# Patient Record
Sex: MALE | Race: WHITE
[De-identification: names, ages, dates, MRNs, and addresses within clinical notes are randomized per-mention and may not be internally consistent; named-entity substitution may affect disease eponyms.]

---

## 2017-03-12 ENCOUNTER — HOSPITAL ENCOUNTER (INPATIENT)
Dept: HOSPITAL 25 - ED | Age: 82
LOS: 4 days | Discharge: NURSING, REHAB OR LONG-TERM CARE | DRG: 470 | End: 2017-03-16
Attending: INTERNAL MEDICINE | Admitting: HOSPITALIST
Payer: MEDICARE

## 2017-03-12 DIAGNOSIS — E87.8: ICD-10-CM

## 2017-03-12 DIAGNOSIS — I11.0: ICD-10-CM

## 2017-03-12 DIAGNOSIS — Z23: ICD-10-CM

## 2017-03-12 DIAGNOSIS — I48.0: ICD-10-CM

## 2017-03-12 DIAGNOSIS — I50.32: ICD-10-CM

## 2017-03-12 DIAGNOSIS — D62: ICD-10-CM
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
## 2017-03-12 DIAGNOSIS — Z79.01: ICD-10-CM

## 2017-03-12 DIAGNOSIS — E87.1: ICD-10-CM

## 2017-03-12 DIAGNOSIS — I48.92: ICD-10-CM
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
## 2017-03-12 DIAGNOSIS — R41.0: ICD-10-CM

## 2017-03-12 DIAGNOSIS — Z95.2: ICD-10-CM
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
## 2017-03-12 DIAGNOSIS — S72.001A: Primary | ICD-10-CM

## 2017-03-12 DIAGNOSIS — M19.90: ICD-10-CM

## 2017-03-12 DIAGNOSIS — Z88.1: ICD-10-CM

## 2017-03-12 LAB
ALBUMIN SERPL BCG-MCNC: 4.2 G/DL (ref 3.2–5.2)
ALP SERPL-CCNC: 66 U/L (ref 34–104)
ALT SERPL W P-5'-P-CCNC: 19 U/L (ref 7–52)
ANION GAP SERPL CALC-SCNC: 7 MMOL/L (ref 2–11)
AST SERPL-CCNC: 30 U/L (ref 13–39)
BUN SERPL-MCNC: 19 MG/DL (ref 6–24)
BUN/CREAT SERPL: 17.6 (ref 8–20)
CALCIUM SERPL-MCNC: 9.7 MG/DL (ref 8.6–10.3)
CHLORIDE SERPL-SCNC: 91 MMOL/L (ref 101–111)
GLOBULIN SER CALC-MCNC: 3.4 G/DL (ref 2–4)
GLUCOSE SERPL-MCNC: 82 MG/DL (ref 70–100)
HCO3 SERPL-SCNC: 29 MMOL/L (ref 22–32)
HCT VFR BLD AUTO: 44 % (ref 42–52)
HGB BLD-MCNC: 15.1 G/DL (ref 14–18)
MCH RBC QN AUTO: 34 PG (ref 27–31)
MCHC RBC AUTO-ENTMCNC: 34 G/DL (ref 31–36)
MCV RBC AUTO: 99 FL (ref 80–94)
POTASSIUM SERPL-SCNC: 4.4 MMOL/L (ref 3.5–5)
PROT SERPL-MCNC: 7.6 G/DL (ref 6.4–8.9)
RBC # BLD AUTO: 4.44 10^6/UL (ref 4–5.4)
SODIUM SERPL-SCNC: 127 MMOL/L (ref 133–145)
TROPONIN I SERPL-MCNC: 0.01 NG/ML (ref ?–0.04)
WBC # BLD AUTO: 9.6 10^3/UL (ref 3.5–10.8)

## 2017-03-12 PROCEDURE — 83605 ASSAY OF LACTIC ACID: CPT

## 2017-03-12 PROCEDURE — 90732 PPSV23 VACC 2 YRS+ SUBQ/IM: CPT

## 2017-03-12 PROCEDURE — 80053 COMPREHEN METABOLIC PANEL: CPT

## 2017-03-12 PROCEDURE — 81015 MICROSCOPIC EXAM OF URINE: CPT

## 2017-03-12 PROCEDURE — 86901 BLOOD TYPING SEROLOGIC RH(D): CPT

## 2017-03-12 PROCEDURE — 85014 HEMATOCRIT: CPT

## 2017-03-12 PROCEDURE — 85610 PROTHROMBIN TIME: CPT

## 2017-03-12 PROCEDURE — 72170 X-RAY EXAM OF PELVIS: CPT

## 2017-03-12 PROCEDURE — 85025 COMPLETE CBC W/AUTO DIFF WBC: CPT

## 2017-03-12 PROCEDURE — 84484 ASSAY OF TROPONIN QUANT: CPT

## 2017-03-12 PROCEDURE — 93005 ELECTROCARDIOGRAM TRACING: CPT

## 2017-03-12 PROCEDURE — 71010: CPT

## 2017-03-12 PROCEDURE — C1776 JOINT DEVICE (IMPLANTABLE): HCPCS

## 2017-03-12 PROCEDURE — 85730 THROMBOPLASTIN TIME PARTIAL: CPT

## 2017-03-12 PROCEDURE — 88305 TISSUE EXAM BY PATHOLOGIST: CPT

## 2017-03-12 PROCEDURE — 86922 COMPATIBILITY TEST ANTIGLOB: CPT

## 2017-03-12 PROCEDURE — 82565 ASSAY OF CREATININE: CPT

## 2017-03-12 PROCEDURE — 88311 DECALCIFY TISSUE: CPT

## 2017-03-12 PROCEDURE — 85018 HEMOGLOBIN: CPT

## 2017-03-12 PROCEDURE — 85060 BLOOD SMEAR INTERPRETATION: CPT

## 2017-03-12 PROCEDURE — 86850 RBC ANTIBODY SCREEN: CPT

## 2017-03-12 PROCEDURE — 84520 ASSAY OF UREA NITROGEN: CPT

## 2017-03-12 PROCEDURE — 36415 COLL VENOUS BLD VENIPUNCTURE: CPT

## 2017-03-12 PROCEDURE — 81003 URINALYSIS AUTO W/O SCOPE: CPT

## 2017-03-12 PROCEDURE — 62323 NJX INTERLAMINAR LMBR/SAC: CPT

## 2017-03-12 PROCEDURE — 80048 BASIC METABOLIC PNL TOTAL CA: CPT

## 2017-03-12 PROCEDURE — C1713 ANCHOR/SCREW BN/BN,TIS/BN: HCPCS

## 2017-03-12 PROCEDURE — 86900 BLOOD TYPING SEROLOGIC ABO: CPT

## 2017-03-12 PROCEDURE — P9040 RBC LEUKOREDUCED IRRADIATED: HCPCS

## 2017-03-12 RX ADMIN — OXYCODONE HYDROCHLORIDE PRN MG: 5 CAPSULE ORAL at 21:56

## 2017-03-12 RX ADMIN — SOTALOL HYDROCHLORIDE SCH MG: 80 TABLET ORAL at 17:37

## 2017-03-12 RX ADMIN — Medication SCH CAP: at 20:46

## 2017-03-12 RX ADMIN — ACETAMINOPHEN SCH MG: 325 TABLET ORAL at 20:46

## 2017-03-12 RX ADMIN — HEPARIN SODIUM SCH UNITS: 5000 INJECTION INTRAVENOUS; SUBCUTANEOUS at 21:52

## 2017-03-12 RX ADMIN — BIMATOPROST SCH DROP: 0.1 SOLUTION/ DROPS OPHTHALMIC at 20:47

## 2017-03-12 NOTE — ED
Suze MACEDO Michael, scribed for John Franklin MD on 17 at 1222 .





Adult Trauma





- HPI Summary


HPI Summary: 





88 y/o male comes to the ED presenting with right hip pain after slipping on 

ice while brushing the snow off his car today at 0945. The pt reports that he 

fell on his right hip and was unable to get up after the fall because the pain 

was so severe. Currently at the ED, the pt rates the pain a 2 out of 10 without 

movement. With movement, the pain is rated a 4 out of 10 on a pain severity 

scale. The right hip pain does not radiate. The pt denies head pain, neck pain, 

and LOC. The PMHx is significant for GERD, HTN, and A-fib.





- History of Current Complaint


Chief Complaint: EDExtremityLower


Stated Complaint: FALL


Time Seen by Provider: 17 11:51


Hx Obtained From: Patient, Medical Records


Mechanism of Injury: Fall


Loss of Consciousness: no loss of consciousness


Onset/Duration: Started Hours Ago, Still Present


Onset of Pain: Immediate


Onset Severity: Moderate


Current Severity: Mild


Pain Intensity: 1


Pain Scale Used: 0-10 Numeric


Location: Extremities - right hip pain


Aggravating Factor(s): Movement


Associated Signs & Symptoms: Positive: Negative.  Negative: Loss of 

Consciousness - head and neck trauma





- Additional Pertinent History


Primary Care Physician: ANNA MARIE





- Allergy/Home Medications


Allergies/Adverse Reactions: 


 Allergies











Allergy/AdvReac Type Severity Reaction Status Date / Time


 


Tetracyclines & Related Allergy  Unknown Verified 17 14:19





   Reaction  





   Details  











Home Medications: 


 Home Medications





Bimatoprost 0.01% OPHTH (NF) [Lumigan 0.01% OPHTH (NF)] 1 drop BOTH EYES QPM  [History Confirmed 17]


Sotalol TAB* [Betapace 80 MG TAB*] 40 mg PO BID 17 [History Confirmed ]











PMH/Surg Hx/FS Hx/Imm Hx


Endocrine/Hematology History: Reports: Other Endocrine/Hematological Disorders 

- Pt reports Hx of Rheumatoid arthritis for two years, then dismissed


Cardiovascular History: Reports: Hx Atrial Fibrillation, Hx Congestive Heart 

Failure, Hx Hypertension - on meds


Respiratory History: Reports: Hx Pneumonia - 5 years ago


   Denies: Hx Asthma, Hx Chronic Obstructive Pulmonary Disease (COPD)


GI History: Reports: Hx Gastroesophageal Reflux Disease


 History: Reports: Other  Problems/Disorders - BPH


Musculoskeletal History: Reports: Hx Orthopedic Injury - R ankle


Sensory History: Reports: Hx Contacts or Glasses, Hx Glaucoma


Opthamlomology History: Reports: Hx Contacts or Glasses, Hx Glaucoma





- Surgical History


Surgery Procedure, Year, and Place: "Kidney lesions removed when I was very 

young", bilateral carpal tunnel, arthroscopy, tonsillectomy, dental surgeries


Hx Anesthesia Reactions: No


Infectious Disease History: No


Infectious Disease History: 


   Denies: Traveled Outside the [place] in Last 30 Days





- Family History


Known Family History: Positive: None


Family History: pt denies significant FHx





- Social History


Occupation: Retired


Lives: With Family


Alcohol Use: None


Substance Use Type: Reports: None


Smoking Status (MU): Never Smoked Tobacco





Review of Systems


Negative: Fever


Positive: Other - right hip pain


Negative: Syncope


All Other Systems Reviewed And Are Negative: Yes





Physical Exam





- Summary


Physical Exam Summary: 





Vital signs: reviewed


General: Patient is comfortable lying in stretcher with no signs of distress


HEENT: within normal limits


Lungs: CTA B/L


CVS: S1 & S2 present. No murmurs appreciated.


ABDOMEN: Soft, non-tender. No signs of distention. No rebound no guarding, and 

no masses palpated. Bowel sounds are normal. 


EXTREMITIES: Decrease ROM of right hip secondary to pain, good pulses and 

capillary refill.


NEURO: Alert and oriented x 3. No acute neurological deficits. Speech is normal 

and follows commands. 


SKIN: Dry and warm





Triage Information Reviewed: Yes


Vital Signs On Initial Exam: 


 Initial Vitals











Temp Pulse Resp BP Pulse Ox


 


 97.7 F   69   16   160/82   94 


 


 17 11:46  17 11:46  17 11:46  17 11:46  17 11:46











Vital Signs Reviewed: Yes





Diagnostics





- Vital Signs


 Vital Signs











  Temp Pulse Resp BP Pulse Ox


 


 17 11:46  97.7 F  69  16  160/82  94














- Laboratory


Result Diagrams: 


 17 12:14





 17 12:14


Lab Statement: Any lab studies that have been ordered have been reviewed, and 

results considered in the medical decision making process.





- Radiology


  ** CXR


Xray Interpretation: No Acute Changes


Radiology Interpretation Completed By: Radiologist





  ** Right hip/pelvis XR


Xray Interpretation: Positive (See Comments) - compacted fracture right femoral 

neck


Radiology Interpretation Completed By: Radiologist





- EKG


  ** EK


EKG Rhythm: Sinus Rhythm - 84 bpm


EKG Interpretation: no st elevation





  ** EKG 1248


EKG Rhythm: Sinus Rhythm


EKG Interpretation: no st elevation 





Adult Trauma Course/Dx





- Course


Course Of Treatment: 88 y/o male comes to the ED presenting with right hip pain 

after slipping on ice while brushing the snow off his car today at 0945. The pt 

reports that he fell on his right hip and was unable to get up after the fall 

because the pain was so severe. Currently at the ED, the pt rates the pain a 2 

out of 10 without movement. With movement, the pain is rated a 4 out of 10 on a 

pain severity scale. The right hip pain does not radiate. The pt denies head 

pain, neck pain, and LOC. The PMHx is significant for GERD, HTN, and A-fib.  

Blood work is within normal limits and showed INR 1.22 and sodium 127. The XR 

of the right hip and pelvis shows right femoral neck fracture. CXR shows no 

active cardiopulmonary disease. The EKG shows NSR with no st elevation. The 

patient has become comfortable with no pain due to medication. Dr. Ashraf (

orthopedics) was contacted and Dr. Ashraf will consult the patient.  Dr. Rodriguez (Hospitalist) was consulted and accepts the patient to the hospital.





- Diagnoses


Differential Diagnosis/HQI/PQRI: Positive: Fracture, Laceration(s), Sprain, 

Strain


Provider Diagnoses: 


 Fracture of femoral neck, right








Discharge





- Discharge Plan


Condition: Stable


Disposition: ADMITTED TO [facility]


Discharge Disposition Comment: Dr. Rodriguez accepts the patient as an admission





The documentation as recorded by the Suze martinez Michael accurately 

reflects the service I personally performed and the decisions made by me, John Franklin MD.

## 2017-03-12 NOTE — RAD
Indication: Fall, right hip pain.



2 views of the right hip and an AP view the pelvis demonstrates a fracture through the

neck of the right femoral neck. Overriding of the fracture fragment is noted. Pelvic ring

is otherwise intact.



IMPRESSION: Impacted fracture right femoral neck.

## 2017-03-12 NOTE — PN
Progress Note





- Progress Note


Note: 





Full note dictated. Right displaced femoral neck fracture. He is 87 and walks 

with a walker for the last 6 months. He takes Xarelto for A fib.





We have to wait at least 24 hours for the Xarelto to clear his system. We will 

arrange for right hip hemiarthroplasty or total hip arthroplasty once cleared 

medically.

## 2017-03-12 NOTE — HP
HOSPITAL MEDICINE HISTORY AND PHYSICAL:

 

DATE OF ADMISSION:  03/12/17

 

PRIMARY CARE PHYSICIAN:  Dr. Stone.

 

ATTENDING PHYSICIAN:  Dr. Marino Rodriguez* (dictation provided by Mandy Hodge NP)
.

 

CHIEF COMPLAINT:  Fall with right hip pain.

 

HISTORY OF PRESENT ILLNESS:  Mr. Don is an 87-year-old male with a past 
medical history of AFib/flutter with aortic stenosis, status post aortic valve 
replacement in May of last year as well as osteoarthritis who presents today to 
the hospital with concern for right hip pain after a fall.  The patient states 
that he was out brushing the snow off the car when he slipped on ice on his 
right hip and had severe pain.  He called his son who came to get him.  He was 
able to get him into the car and arrived in to the emergency room.  Mr. Don 
states that prior to this fall, he had been in his normal state of health.  He 
has had no acute complaints other than problems with arthritis and intermittent 
pain in his joints.  He denies fevers, chills, chest pain, shortness of breath, 
cough, nausea, vomiting, or diarrhea.

 

In the emergency room, Mr. Don was confirmed to have an impacted fracture of 
the right femoral neck.  His lab values are essentially unremarkable with a 
chronic hyponatremia, was essentially unchanged.

 

PAST MEDICAL HISTORY:

1.  Atrial fibrillation/flutter.

2.  Aortic stenosis, status post aortic valve replacement in May 2016, 
bioprosthetic valve.

3.  Osteoarthritis.

4.  Previous question of rheumatoid arthritis, ruled out.

5.  Hypertension.

6.  Surgery for kidney adhesions decades ago.

 

MEDICATIONS:

1.  Bimatoprost 0.01% ophthalmically both eyes q.p.m.

2.  Diltiazem 240 mg p.o. daily.

3.  Furosemide 20 mg p.o. daily.

4.  Potassium chloride 10 mEq p.o. daily.

5.  Rivaroxaban 15 mg p.o. daily.

6.  Zoloft 40 mg p.o. b.i.d.

 

ALLERGIES:  TETRACYCLINES and related.

 

FAMILY HISTORY:  No report of coronary artery disease, diabetes, or cancer in 
the family.  He does have a daughter who has diabetes, but he states that that 
runs in the wife's side of the family.

 

SOCIAL HISTORY:  No report of tobacco or drug use.  The patient states he 
drinks wine occasionally.  He is a [employer] .  He is .  He has 4 
children. His healthcare proxy would be his son, Jose Don.

 

REVIEW OF SYSTEMS:  The patient reports having upper respiratory infection 
about a week ago, but states he is completely recovered from that.  He does 
have chronic pain in his shoulders and ankles from arthritis, but a 14-point 
review of systems was otherwise performed and all those not mentioned were 
negative.

 

                               PHYSICAL EXAMINATION

 

GENERAL:  Mr. Don is lying in the bed.  He is in no acute distress.  He is 
calm and cooperative with my examination.

 

VITAL SIGNS:  Temperature 97.8, pulse rate 82, respiratory rate 20, O2 
saturation 93% on room air, blood pressure 162/71.

 

LUNGS:  Clear to auscultation bilaterally with no accessory muscle use and good 
aeration.

 

HEART:  S1, S2.  No murmur, rub, or gallop and regular.

 

ABDOMEN:  Soft, nontender with bowel sounds positive x4.

 

EXTREMITIES:  No cyanosis or edema.

 

NEUROLOGIC:  He is alert and oriented x3.  He moves all extremities equally.  
There is no facial asymmetry or focal weakness.  Extraocular movements are 
intact.

 

SKIN:  Intact.

 

 LABORATORY DATA AND DIAGNOSTIC STUDIES:  WBC 9.6, hemoglobin 15.1, hematocrit 
44, platelet count 134.  INR 1.22.  Sodium 127, potassium 4.4, chloride 91, 
serum bicarbonate 29, BUN 19, creatinine 1.08, glucose 82.  Troponin 0.01.  
Urine shows no evidence of infection.

 

Hip pelvis x-ray is as read as above and chest x-ray shows the following:  "No 
active cardiopulmonary disease is noted."

 

ASSESSMENT:  Mr. Don is an 87-year-old male with a past medical history of 
atrial fibrillation with aortic stenosis and valve replacement with a 
bioprosthetic valve in May 2016 who presents today to the hospital with concern 
for right hip fracture after a mechanical fall.  Our plan is as follows:

 

1.  Right hip fracture:  Management will be per Orthopedics.  Dr. Ashraf has 
been consulted.  The patient is on Xarelto and will need at least 24 hours 
before he could go to the OR after being off Xarelto.  According to Dr. Ashraf, 
the patient may go to the OR as late as Tuesday.  Family indicates that they 
are considering asking to have Dr. Arango do the surgery as they were very happy 
with the surgery that she did for the patient's wife.  I told them that they 
need to make sure to let the orthopedic team know that as soon as possible if 
that is their decision. In terms of cardiac risk stratification, according to 
the revised cardiac risk index for preoperative risk assessment, the patient 
has no risk factors and is 0.4% risk of major cardiac event.  The patient 
states he is able to obtain 4 METS of activity, i.e., going about flight of 
stairs with carrying a bag of groceries without chest pain or shortness of 
breath.  He says overall his activity is limited by arthritis and he gets 
around with a cane.  The patient had a recent echocardiogram through Dr. Gupta
's office and actually had cardiac catheterization in May prior to his aortic 
valve surgery.  The patient states there was no significant coronary artery 
disease identified and no stent placed.  From my perspective, the patient has 
no indication for further cardiac testing.

2.  Atrial fibrillation/flutter:  Plan to continue sotalol.  Plan to hold 
Xarelto. Continue diltiazem.

3.  Hypertension:  Continue with diltiazem, furosemide, and potassium 
supplementation.

4.  DVT prophylaxis with heparin subcu.

5.  Code status is full code and this was reviewed with the patient at the 
bedside.

6.  Disposition to surgical floor.

 

TIME SPENT:  Approximately 60 minutes were spent on the admission of this 
patient, more than half time spent with him at the bedside reviewing the events 
leading up to this hospitalization, performing the physical examination, and 
reviewing my plan of care.

 

 ____________________________________ 

MANDY HODGE NP



CC:  Dr. Stone *

 

[id number]/[facility] #: [id number]

ZELDA

## 2017-03-12 NOTE — RAD
Indication: Fall. Chest injury.



Single view of the chest demonstrates patient to be status post transsternal thoracotomy.

Lung fields demonstrate no pleural fluid, pneumonia or pneumothorax.



When compared to previous exam of April 20, 2016 postoperative changes are new. The lung

fields are unchanged.



IMPRESSION: No active cardiopulmonary disease is noted.

## 2017-03-13 LAB
ANION GAP SERPL CALC-SCNC: 7 MMOL/L (ref 2–11)
BUN SERPL-MCNC: 18 MG/DL (ref 6–24)
BUN/CREAT SERPL: 20.5 (ref 8–20)
CALCIUM SERPL-MCNC: 9.1 MG/DL (ref 8.6–10.3)
CHLORIDE SERPL-SCNC: 92 MMOL/L (ref 101–111)
GLUCOSE SERPL-MCNC: 94 MG/DL (ref 70–100)
HCO3 SERPL-SCNC: 26 MMOL/L (ref 22–32)
HCT VFR BLD AUTO: 42 % (ref 42–52)
HGB BLD-MCNC: 14.2 G/DL (ref 14–18)
POTASSIUM SERPL-SCNC: 4.1 MMOL/L (ref 3.5–5)
SODIUM SERPL-SCNC: 125 MMOL/L (ref 133–145)

## 2017-03-13 RX ADMIN — ACETAMINOPHEN SCH MG: 325 TABLET ORAL at 07:10

## 2017-03-13 RX ADMIN — ACETAMINOPHEN SCH MG: 325 TABLET ORAL at 21:14

## 2017-03-13 RX ADMIN — TRAMADOL HYDROCHLORIDE PRN MG: 50 TABLET, FILM COATED ORAL at 19:09

## 2017-03-13 RX ADMIN — TRAMADOL HYDROCHLORIDE PRN MG: 50 TABLET, FILM COATED ORAL at 19:06

## 2017-03-13 RX ADMIN — Medication SCH CAP: at 21:14

## 2017-03-13 RX ADMIN — OXYCODONE HYDROCHLORIDE PRN MG: 5 CAPSULE ORAL at 07:09

## 2017-03-13 RX ADMIN — SOTALOL HYDROCHLORIDE SCH MG: 80 TABLET ORAL at 17:01

## 2017-03-13 RX ADMIN — HEPARIN SODIUM SCH UNITS: 5000 INJECTION INTRAVENOUS; SUBCUTANEOUS at 06:10

## 2017-03-13 RX ADMIN — POTASSIUM CHLORIDE SCH MEQ: 750 TABLET, FILM COATED, EXTENDED RELEASE ORAL at 08:45

## 2017-03-13 RX ADMIN — HEPARIN SODIUM SCH UNITS: 5000 INJECTION INTRAVENOUS; SUBCUTANEOUS at 14:00

## 2017-03-13 RX ADMIN — Medication SCH CAP: at 08:46

## 2017-03-13 RX ADMIN — DILTIAZEM HYDROCHLORIDE SCH MG: 240 CAPSULE, COATED, EXTENDED RELEASE ORAL at 08:46

## 2017-03-13 RX ADMIN — FUROSEMIDE SCH MG: 20 TABLET ORAL at 08:45

## 2017-03-13 RX ADMIN — SOTALOL HYDROCHLORIDE SCH MG: 80 TABLET ORAL at 08:44

## 2017-03-13 RX ADMIN — BIMATOPROST SCH DROP: 0.1 SOLUTION/ DROPS OPHTHALMIC at 17:01

## 2017-03-13 NOTE — PN
Subjective


Date of Service: 03/13/17


Interval History: 





Patient seen and examined at bedside. He reports having no pain and feels "dopey

" from his oxycodone. He denies CP, SOB, abd pain, n/v. He is hopeful to have 

surgery tomorrow. No other complaints at this time.








Family History: Unchanged from Admission


Social History: Unchanged from Admission


Past Medical History: Unchanged from Admission





Objective


Active Medications: 








Acetaminophen (Tylenol Tab*)  975 mg PO BID [facility]


   Last Admin: 03/13/17 07:10 Dose:  650 mg


Bimatoprost (Lumigan 0.01% Ophth (Nf))  1 drop BOTH EYES QPM [facility]


   Last Admin: 03/12/17 20:47 Dose:  1 drop


Diltiazem HCl (Cardizem Cd Cap*)  240 mg PO DAILY [facility]


   Last Admin: 03/13/17 08:46 Dose:  240 mg


Furosemide (Lasix Tab*)  20 mg PO DAILY [facility]


   Last Admin: 03/13/17 08:45 Dose:  20 mg


Heparin Sodium (Porcine) (Heparin Vial(*))  5,000 units SUBCUT Q8HR [facility]


   Last Admin: 03/13/17 06:10 Dose:  5,000 units


Multivitamins/Minerals (Preservision Areds(Multivitamins/Mineral)(Nf))  1 cap 

PO BID [facility]


   Last Admin: 03/13/17 08:46 Dose:  1 cap


Oxycodone HCl (Roxycodone Tab*)  5 mg PO Q4H PRN


   PRN Reason: PAIN


   Last Admin: 03/13/17 07:09 Dose:  5 mg


Potassium Chloride (Klor Con Er Tab*)  10 meq PO DAILY [facility]


   Last Admin: 03/13/17 08:45 Dose:  10 meq


Sotalol HCl (Betapace Tab*)  40 mg PO 0900,1700 [facility]


   Last Admin: 03/13/17 08:44 Dose:  40 mg


Tramadol HCl (Ultram*)  100 mg PO Q6H PRN


   PRN Reason: PAIN








 Vital Signs











  03/12/17 03/12/17 03/12/17





  14:28 14:30 16:10


 


Temperature   99.0 F


 


Pulse Rate 50 83 89


 


Respiratory 16 14 16





Rate   


 


Blood Pressure 152/81 149/74 132/78





(mmHg)   


 


O2 Sat by Pulse 90 96 





Oximetry   














  03/12/17 03/12/17 03/12/17





  16:15 16:23 16:46


 


Temperature 97.8 F 97.8 F 


 


Pulse Rate 82 82 


 


Respiratory 20 20 20





Rate   


 


Blood Pressure 162/71 162/71 





(mmHg)   


 


O2 Sat by Pulse 93 93 





Oximetry   














  03/12/17 03/12/17 03/12/17





  19:20 19:48 21:56


 


Temperature  97.8 F 


 


Pulse Rate  82 


 


Respiratory 20 20 20





Rate   


 


Blood Pressure  145/76 





(mmHg)   


 


O2 Sat by Pulse  97 





Oximetry   














  03/12/17 03/13/17 03/13/17





  23:56 00:25 03:40


 


Temperature  97.6 F 97.9 F


 


Pulse Rate  77 82


 


Respiratory 20 16 16





Rate   


 


Blood Pressure  112/57 125/65





(mmHg)   


 


O2 Sat by Pulse  94 93





Oximetry   














  03/13/17 03/13/17 03/13/17





  07:09 07:24 08:00


 


Temperature  98.0 F 


 


Pulse Rate  67 


 


Respiratory 16 17 18





Rate   


 


Blood Pressure  142/72 





(mmHg)   


 


O2 Sat by Pulse  94 





Oximetry   











Oxygen Devices in Use Now: None


Appearance: Older male patient, lying in bed, in NAD


Eyes: PERRLA


Ears/Nose/Mouth/Throat: Clear Oropharnyx, Mucous Membranes Moist


Neck: NL Appearance and Movements; NL JVP


Respiratory: Symmetrical Chest Expansion and Respiratory Effort, Clear to 

Auscultation


Cardiovascular: NL Sounds; No Murmurs; No JVD, RRR


Abdominal: NL Sounds; No Tenderness; No Distention


Extremities: No Edema, - - distal pulses intact bilterally with good movement/

sensation


Skin: No Rash or Ulcers


Neurological: Alert and Oriented x 3


Lines/Tubes/Other Access: Clean, Dry and Intact Peripheral IV


Nutrition: Taking PO's


Result Diagrams: 


 03/12/17 12:14





 03/13/17 06:39





Assess/Plan/Problems-Billing


Assessment: 





Mr. Don is an 88 yo male with a PMH of atrial fibrillation, AS s/p 

bioprosthetic aortic valve replacement, OA, and HTN who presented to the ED on 3

/12/17 s/p fall and subsequent right hip fracture.











- Patient Problems


(1) Fracture of femoral neck, right


Code(s): S72.001A - FRACTURE OF UNSP PART OF NECK OF RIGHT FEMUR, INIT   Comment

: 


Potential surgery tomorrow per ortho notes


Pre-operative risk analysis in H&P


Continue DVT prophylaxis and encourage IS


Pain management   





(2) Atrial fibrillation


Code(s): I48.91 - UNSPECIFIED ATRIAL FIBRILLATION   Comment: 


Continue sotalol and diltiazem.


Hold Xarelto in anticipation of surgery.   





(3) Hypertension


Code(s): I10 - ESSENTIAL (PRIMARY) HYPERTENSION   Comment: 


Continue diltiazem, furosemide, and K+ replacement.   





(4) S/P aortic valve replacement


Code(s): Z95.2 - PRESENCE OF PROSTHETIC HEART VALVE   Comment: 


In May 2016   





(5) DVT prophylaxis


Code(s): LXM0075 -    Comment: 


SQ heparin   


Status and Disposition: 





Inpatient admission. Anticipate LOS >2 days.

## 2017-03-13 NOTE — CONS
CONSULTATION REPORT:

 

DATE OF CONSULT:  03/12/17

 

CHIEF COMPLAINT:  Right hip fracture.

 

HISTORY OF PRESENT ILLNESS:  Mr. Castaneda is an 87-year-old male who has AFib and 
aflutter, is status post aortic valve replacement in May of last year.  He had 
a mechanical fall when he was brushing the snow off his car and fractured his 
right hip on 03/12/17.  I was consulted for the right hip fracture.  Prior to 
the fall, he has been walking with a walker for the last 6 months.  He states 
he just is a little bit more steady on his feet with the walker.  He states he 
could use a cane. He says he gets some soreness in the right hip and has some 
pain in the left hip.

 

PAST MEDICAL HISTORY:

1.  AFib/flutter.

2.  Aortic stenosis, status post aortic valve replacement in May 2016 with a 
bioprosthetic valve.

3.  Osteoarthritis.

4.  Hypertension.

5.  Status post kidney surgery.

 

MEDICATIONS:

1.  Bimatoprost.

2.  Diltiazem.

3.  Furosemide.

4.  Potassium chloride.

6.  Rivaroxaban.

7.  Zoloft.

 

ALLERGIES:  TETRACYCLINES.

 

FAMILY HISTORY:  Noncontributory.

 

SOCIAL HISTORY:  He does not smoke.  He drinks an occasional glass of wine.  He 
is a retired .  He lives independently.

 

REVIEW OF SYSTEMS:  He had an upper respiratory infection about a week ago and 
has new onset of right hip pain as well as some bilateral hip soreness prior to 
the fall.  He has chronic pain in his shoulders and ankles.  Otherwise a full 14
-point review of systems was conducted and was negative.

 

PHYSICAL EXAM:  General:  Awake, alert, and oriented.  Psych:  Mood and affect 
are normal.  HEENT: Normocephalic, atraumatic.  Normal facies.  Neck:  Supple.  
Good range of motion.  Abdomen:  Soft and nondistended.  Neurological:  He has 
intact neurological function distally on the affected right leg.  Neurological 
exam is grossly normal.  Skin: Intact.  No bleeding about the fracture site 
whatsoever. Extremities:  The right lower extremity is shortened and externally 
rotated. Secondary survey of the other arms and other leg show no significant 
discomfort.  The pelvis is stable.  The knee and ankle on the right leg are 
without significant abnormality.

 

DIAGNOSTIC STUDIES/LAB DATA:  Imaging:  X-rays show a displaced right femoral 
neck fracture that is in varus.

 

H and H is 15 and 44, INR is 121.22.  Sodium is 127, creatinine is 1.08.

 

IMPRESSION:  Right displaced femoral neck fracture.

 

PLAN:  His Xarelto is being held.  We will plan for operative treatment for the 
fracture in the form of either a right hip hemiarthroplasty or a total hip 
arthroplasty.  We will wait a little bit more time just to have the Xarelto 
wear off a bit more, and then we will proceed with surgery, either myself or 
one of my partners.

 

 [id number]/[facility] #: [id number]

MTDD

## 2017-03-14 LAB
ADD DIFF/SLIDE REVIEW?: (no result)
ANION GAP SERPL CALC-SCNC: 6 MMOL/L (ref 2–11)
BUN SERPL-MCNC: 14 MG/DL (ref 6–24)
BUN SERPL-MCNC: 15 MG/DL (ref 6–24)
BUN/CREAT SERPL: 20.8 (ref 8–20)
CALCIUM SERPL-MCNC: 8.4 MG/DL (ref 8.6–10.3)
CHLORIDE SERPL-SCNC: 94 MMOL/L (ref 101–111)
GLUCOSE SERPL-MCNC: 93 MG/DL (ref 70–100)
HCO3 SERPL-SCNC: 26 MMOL/L (ref 22–32)
HCT VFR BLD AUTO: 33 % (ref 42–52)
HCT VFR BLD AUTO: 39 % (ref 42–52)
HGB BLD-MCNC: 11.3 G/DL (ref 14–18)
HGB BLD-MCNC: 13.4 G/DL (ref 14–18)
MCH RBC QN AUTO: 34 PG (ref 27–31)
MCHC RBC AUTO-ENTMCNC: 34 G/DL (ref 31–36)
MCV RBC AUTO: 99 FL (ref 80–94)
POTASSIUM SERPL-SCNC: 3.8 MMOL/L (ref 3.5–5)
RBC # BLD AUTO: 3.97 10^6/UL (ref 4–5.4)
SODIUM SERPL-SCNC: 126 MMOL/L (ref 133–145)
WBC # BLD AUTO: 11 10^3/UL (ref 3.5–10.8)

## 2017-03-14 PROCEDURE — 0SR902Z REPLACEMENT OF RIGHT HIP JOINT WITH METAL ON POLYETHYLENE SYNTHETIC SUBSTITUTE, OPEN APPROACH: ICD-10-PCS | Performed by: ORTHOPAEDIC SURGERY

## 2017-03-14 RX ADMIN — SOTALOL HYDROCHLORIDE SCH MG: 80 TABLET ORAL at 17:30

## 2017-03-14 RX ADMIN — POTASSIUM CHLORIDE SCH MEQ: 750 TABLET, FILM COATED, EXTENDED RELEASE ORAL at 08:43

## 2017-03-14 RX ADMIN — BIMATOPROST SCH DROP: 0.1 SOLUTION/ DROPS OPHTHALMIC at 17:49

## 2017-03-14 RX ADMIN — ACETAMINOPHEN SCH: 325 TABLET ORAL at 09:30

## 2017-03-14 RX ADMIN — FUROSEMIDE SCH: 20 TABLET ORAL at 09:30

## 2017-03-14 RX ADMIN — Medication SCH CAP: at 21:30

## 2017-03-14 RX ADMIN — TRAMADOL HYDROCHLORIDE PRN MG: 50 TABLET, FILM COATED ORAL at 03:23

## 2017-03-14 RX ADMIN — SOTALOL HYDROCHLORIDE SCH MG: 80 TABLET ORAL at 08:41

## 2017-03-14 RX ADMIN — DILTIAZEM HYDROCHLORIDE SCH MG: 240 CAPSULE, COATED, EXTENDED RELEASE ORAL at 08:42

## 2017-03-14 RX ADMIN — CEFAZOLIN SODIUM SCH MLS/HR: 1 SOLUTION INTRAVENOUS at 17:49

## 2017-03-14 RX ADMIN — Medication SCH CAP: at 08:43

## 2017-03-14 RX ADMIN — DOCUSATE SODIUM SCH MG: 100 CAPSULE, LIQUID FILLED ORAL at 21:30

## 2017-03-14 RX ADMIN — ACETAMINOPHEN SCH MG: 325 TABLET ORAL at 21:30

## 2017-03-14 NOTE — RAD
INDICATION:  Status post total right hip replacement surgery.



COMPARISON: Comparison is made with a prior x-ray study of the right hip from March 12, 2017.



TECHNIQUE: An AP view of the pelvis was obtained.



FINDINGS:  The patient is status post total right hip replacement surgery. The bones and

prostheses are in normal alignment.  There is a small amount of air in the surrounding

soft tissues consistent with the patient's recent surgery.



Incidental note is made of moderate osteoarthritic change in the left hip.



IMPRESSION:  STATUS POST TOTAL RIGHT HIP PLACEMENT SURGERY.

## 2017-03-14 NOTE — PN
Subjective


Date of Service: 03/14/17


Interval History: 





Pt is seen in pre op area. no complaints. Ready for surgery


Family History: Unchanged from Admission


Social History: Unchanged from Admission


Past Medical History: Unchanged from Admission





Objective


Active Medications: 








Acetaminophen (Tylenol Tab*)  975 mg PO BID [facility]


   Last Admin: 03/13/17 21:14 Dose:  975 mg


Bimatoprost (Lumigan 0.01% Ophth (Nf))  1 drop BOTH EYES QPM [facility]


   Last Admin: 03/13/17 17:01 Dose:  1 drop


Diltiazem HCl (Cardizem Cd Cap*)  240 mg PO DAILY [facility]


   Last Admin: 03/14/17 08:42 Dose:  240 mg


Furosemide (Lasix Tab*)  20 mg PO DAILY [facility]


   Last Admin: 03/13/17 08:45 Dose:  20 mg


Lactated Ringer's (Lactated Ringers 1000 Ml Bag*)  1,000 mls @ 125 mls/hr IV 

PER RATE [facility]


   Last Admin: 03/14/17 05:15 Dose:  125 mls/hr


Multivitamins/Minerals (Preservision Areds(Multivitamins/Mineral)(Nf))  1 cap 

PO BID [facility]


   Last Admin: 03/14/17 08:43 Dose:  1 cap


Oxycodone HCl (Roxycodone Tab*)  5 mg PO Q4H PRN


   PRN Reason: PAIN


   Last Admin: 03/13/17 07:09 Dose:  5 mg


Potassium Chloride (Klor Con Er Tab*)  10 meq PO DAILY [facility]


   Last Admin: 03/14/17 08:43 Dose:  10 meq


Sotalol HCl (Betapace Tab*)  40 mg PO 0900,1700 [facility]


   Last Admin: 03/14/17 08:41 Dose:  40 mg


Tramadol HCl (Ultram*)  100 mg PO Q6H PRN


   PRN Reason: PAIN


   Last Admin: 03/14/17 03:23 Dose:  50 mg








 Vital Signs











  03/13/17 03/13/17 03/13/17





  09:09 11:44 15:28


 


Temperature  97.6 F 99.1 F


 


Pulse Rate  79 85


 


Respiratory 16 17 16





Rate   


 


Blood Pressure  122/52 153/77





(mmHg)   


 


O2 Sat by Pulse  95 94





Oximetry   














  03/13/17 03/13/17 03/13/17





  19:09 19:25 20:00


 


Temperature  98.9 F 


 


Pulse Rate  92 


 


Respiratory 16 20 20





Rate   


 


Blood Pressure  139/68 





(mmHg)   


 


O2 Sat by Pulse  93 





Oximetry   














  03/13/17 03/14/17 03/14/17





  21:09 00:08 03:23


 


Temperature  98.4 F 98.1 F


 


Pulse Rate  78 74


 


Respiratory 20 16 16





Rate   


 


Blood Pressure  129/55 117/55





(mmHg)   


 


O2 Sat by Pulse  93 94





Oximetry   














  03/14/17 03/14/17





  05:23 07:26


 


Temperature  98.0 F


 


Pulse Rate  74


 


Respiratory 16 18





Rate  


 


Blood Pressure  134/63





(mmHg)  


 


O2 Sat by Pulse  94





Oximetry  











Oxygen Devices in Use Now: None


Appearance: 88 yo M in NAD, AAOx3


Eyes: No Scleral Icterus, PERRLA


Ears/Nose/Mouth/Throat: NL Teeth, Lips, Gums, Mucous Membranes Moist


Neck: NL Appearance and Movements; NL JVP, Trachea Midline


Respiratory: Symmetrical Chest Expansion and Respiratory Effort, Clear to 

Auscultation


Cardiovascular: NL Sounds; No Murmurs; No JVD, RRR


Abdominal: NL Sounds; No Tenderness; No Distention, No Hepatosplenomegaly


Lymphatic: No Cervical Adenopathy


Extremities: No Edema, No Clubbing, Cyanosis


Skin: No Rash or Ulcers, No Nodules or Sclerosis


Neurological: Alert and Oriented x 3, NL Muscle Strength and Tone


Result Diagrams: 


 03/13/17 15:40





 03/14/17 05:52





Assess/Plan/Problems-Billing


Assessment: 





Mr. Don is an 88 yo male with a PMH of atrial fibrillation, AS s/p 

bioprosthetic aortic valve replacement, OA, and HTN who presented to the ED on 3

/12/17 s/p fall and subsequent right hip fracture.











- Patient Problems


(1) Fracture of femoral neck, right


Comment: Pre-operative risk analysis in H&P. Pt has good exercise tolerance. 

Echo from 11/2016 shows EF 55%, post op septal wall abn, , mild LVH, 

bioprosthetic valve functioning normally.He is an acceptable candidate for the 

anticipated surgery. Cont Sotalol/Cardizem preop. Hold furosemide preop.


D/w Dr. Aleman





   





(2) Hyponatremia


Comment: chronic dating back to 2014,  at  baseline   





(3) Atrial fibrillation


Comment: Post op valve replacement last year. now in NSR


Continue sotalol and diltiazem.


Hold Xarelto prior to surgery.


May need to be on telem post op   





(4) Hypertension


Comment: Controlled, continue diltiazem   





(5) S/P aortic valve replacement


Comment: In May 2016, valve functioning normally on Echo 11/2016   





(6) DVT prophylaxis


Comment: anticoagulants held preop   


Status and Disposition: 





Inpatient admission. Anticipate LOS >2 days.

## 2017-03-15 LAB
ANION GAP SERPL CALC-SCNC: (no result) MMOL/L (ref 2–11)
BUN SERPL-MCNC: 15 MG/DL (ref 6–24)
BUN/CREAT SERPL: 21.7 (ref 8–20)
CALCIUM SERPL-MCNC: 8.1 MG/DL (ref 8.6–10.3)
CHLORIDE SERPL-SCNC: 94 MMOL/L (ref 101–111)
GLUCOSE SERPL-MCNC: 123 MG/DL (ref 70–100)
HCO3 SERPL-SCNC: 25 MMOL/L (ref 22–32)
HCT VFR BLD AUTO: 28 % (ref 42–52)
HGB BLD-MCNC: 9.8 G/DL (ref 14–18)
MCH RBC QN AUTO: 34 PG (ref 27–31)
MCHC RBC AUTO-ENTMCNC: 35 G/DL (ref 31–36)
MCV RBC AUTO: 98 FL (ref 80–94)
POTASSIUM SERPL-SCNC: (no result) MMOL/L (ref 3.5–5)
RBC # BLD AUTO: 2.89 10^6/UL (ref 4–5.4)
SODIUM SERPL-SCNC: 124 MMOL/L (ref 133–145)
WBC # BLD AUTO: 9.6 10^3/UL (ref 3.5–10.8)

## 2017-03-15 PROCEDURE — 30233N1 TRANSFUSION OF NONAUTOLOGOUS RED BLOOD CELLS INTO PERIPHERAL VEIN, PERCUTANEOUS APPROACH: ICD-10-PCS | Performed by: ORTHOPAEDIC SURGERY

## 2017-03-15 RX ADMIN — ACETAMINOPHEN SCH MG: 325 TABLET ORAL at 21:43

## 2017-03-15 RX ADMIN — HEPARIN SODIUM SCH UNITS: 5000 INJECTION INTRAVENOUS; SUBCUTANEOUS at 21:44

## 2017-03-15 RX ADMIN — DILTIAZEM HYDROCHLORIDE SCH MG: 240 CAPSULE, COATED, EXTENDED RELEASE ORAL at 09:33

## 2017-03-15 RX ADMIN — DOCUSATE SODIUM SCH MG: 100 CAPSULE, LIQUID FILLED ORAL at 09:33

## 2017-03-15 RX ADMIN — CEFAZOLIN SODIUM SCH MLS/HR: 1 SOLUTION INTRAVENOUS at 06:45

## 2017-03-15 RX ADMIN — Medication SCH CAP: at 21:44

## 2017-03-15 RX ADMIN — SOTALOL HYDROCHLORIDE SCH MG: 80 TABLET ORAL at 17:07

## 2017-03-15 RX ADMIN — CEFAZOLIN SODIUM SCH MLS/HR: 1 SOLUTION INTRAVENOUS at 00:28

## 2017-03-15 RX ADMIN — CEFAZOLIN SODIUM SCH: 1 SOLUTION INTRAVENOUS at 12:41

## 2017-03-15 RX ADMIN — FUROSEMIDE SCH MG: 20 TABLET ORAL at 09:32

## 2017-03-15 RX ADMIN — DOCUSATE SODIUM SCH MG: 100 CAPSULE, LIQUID FILLED ORAL at 21:43

## 2017-03-15 RX ADMIN — ACETAMINOPHEN SCH: 325 TABLET ORAL at 09:33

## 2017-03-15 RX ADMIN — POTASSIUM CHLORIDE SCH MEQ: 750 TABLET, FILM COATED, EXTENDED RELEASE ORAL at 09:32

## 2017-03-15 RX ADMIN — HEPARIN SODIUM SCH UNITS: 5000 INJECTION INTRAVENOUS; SUBCUTANEOUS at 09:32

## 2017-03-15 RX ADMIN — SOTALOL HYDROCHLORIDE SCH MG: 80 TABLET ORAL at 09:33

## 2017-03-15 RX ADMIN — BIMATOPROST SCH DROP: 0.1 SOLUTION/ DROPS OPHTHALMIC at 17:08

## 2017-03-15 RX ADMIN — Medication SCH CAP: at 09:32

## 2017-03-15 NOTE — PN
Subjective


Date of Service: 03/15/17


Interval History: 





Pt feels well. Almost no post op pain.


Family History: Unchanged from Admission


Social History: Unchanged from Admission


Past Medical History: Unchanged from Admission





Objective


Active Medications: 








Acetaminophen (Tylenol Tab*)  975 mg PO BID [facility]


   Last Admin: 03/15/17 09:33 Dose:  Not Given


Acetaminophen (Tylenol Tab*)  650 mg PO Q4H PRN


   PRN Reason: mild pain or fever


Bimatoprost (Lumigan 0.01% Ophth (Nf))  1 drop BOTH EYES QPM [facility]


   Last Admin: 03/14/17 17:49 Dose:  1 drop


Bisacodyl (Dulcolax Supp*)  10 mg MA DAILY PRN


   PRN Reason: constipation


Diltiazem HCl (Cardizem Cd Cap*)  240 mg PO DAILY [facility]


   Last Admin: 03/15/17 09:33 Dose:  240 mg


Diphenhydramine HCl (Benadryl Iv*)  12.5 mg IV Q6H PRN


   PRN Reason: PRURITIS


Diphenhydramine HCl (Benadryl Po*)  25 mg PO Q6H PRN


   PRN Reason: itching


Docusate Sodium (Colace Cap*)  100 mg PO BID [facility]


   Last Admin: 03/15/17 09:33 Dose:  100 mg


Furosemide (Lasix Tab*)  20 mg PO DAILY [facility]


   Last Admin: 03/15/17 09:32 Dose:  20 mg


Heparin Sodium (Porcine) (Heparin Vial(*))  5,000 units SUBCUT Q12HR [facility]


   Last Admin: 03/15/17 09:32 Dose:  5,000 units


Cefazolin Sodium/Dextrose (Kefzol 1 Gm In Dextrose Duplex (*))  1 gm in 50 mls 

@ 200 mls/hr IVPB Q6H [facility]


   Last Admin: 03/15/17 06:45 Dose:  200 mls/hr


Morphine Sulfate (Morphine Inj (Syringe)*)  5 mg IV Q2H PRN


   PRN Reason: PAIN


Multivitamins/Minerals (Preservision Areds(Multivitamins/Mineral)(Nf))  1 cap 

PO BID [facility]


   Last Admin: 03/15/17 09:32 Dose:  1 cap


Ondansetron HCl (Zofran Inj*)  4 mg IV Q6H PRN


   PRN Reason: nausea


Ondansetron HCl (Zofran Tab*)  4 mg PO Q6H PRN


   PRN Reason: NAUSEA


Oxycodone HCl (Roxycodone Tab*)  5 mg PO Q4H PRN


   PRN Reason: PAIN


   Last Admin: 03/13/17 07:09 Dose:  5 mg


Oxycodone HCl (Roxycodone Tab*)  5 mg PO ONCE PRN


   PRN Reason: Moderate Pain


   Stop: 03/15/17 12:45


Oxycodone HCl (Roxycodone Tab*)  10 mg PO Q4H PRN


   PRN Reason: breakthrough pain


Oxycodone/Acetaminophen (Percocet 5/325 Tab*)  2 tab PO Q3H PRN


   PRN Reason: PAIN - MODERATE


Oxycodone/Acetaminophen (Percocet 5/325 Tab*)  1 tab PO Q3H PRN


   PRN Reason: moderate to severe pain


Pharmacy Profile Note (Scopolomine Patch Remove*)  1 note PATCH OFF .AFTER 72 

HOURS [facility]


   Stop: 03/17/17 12:46


Potassium Chloride (Klor Con Er Tab*)  10 meq PO DAILY [facility]


   Last Admin: 03/15/17 09:32 Dose:  10 meq


Sotalol HCl (Betapace Tab*)  40 mg PO 0900,1700 [facility]


   Last Admin: 03/15/17 09:33 Dose:  40 mg


Tramadol HCl (Ultram*)  100 mg PO Q6H PRN


   PRN Reason: PAIN


   Last Admin: 03/14/17 03:23 Dose:  50 mg








 Vital Signs











  03/14/17 03/14/17 03/14/17





  13:27 13:54 13:55


 


Temperature 98.2 F  


 


Pulse Rate 76 73 78


 


Respiratory 22 16 16





Rate   


 


Blood Pressure 143/67 133/67 131/62





(mmHg)   


 


O2 Sat by Pulse 98 98 98





Oximetry   














  03/14/17 03/14/17 03/14/17





  14:00 14:05 14:15


 


Temperature   


 


Pulse Rate 73 72 74


 


Respiratory 16 16 16





Rate   


 


Blood Pressure 118/60 130/64 122/69





(mmHg)   


 


O2 Sat by Pulse 98 98 98





Oximetry   














  03/14/17 03/14/17 03/14/17





  14:30 14:45 15:00


 


Temperature   


 


Pulse Rate 71 70 76


 


Respiratory 16 16 16





Rate   


 


Blood Pressure 132/60 131/64 127/55





(mmHg)   


 


O2 Sat by Pulse 99 100 99





Oximetry   














  03/14/17 03/14/17 03/14/17





  15:15 15:33 15:45


 


Temperature 98.2 F 98.2 F 


 


Pulse Rate 73 70 73


 


Respiratory 12 16 17





Rate   


 


Blood Pressure 143/67 140/87 149/70





(mmHg)   


 


O2 Sat by Pulse 90 95 98





Oximetry   














  03/14/17 03/14/17 03/14/17





  16:00 16:15 16:27


 


Temperature   98.2 F


 


Pulse Rate  73 73


 


Respiratory 15 12 12





Rate   


 


Blood Pressure  116/77 116/76





(mmHg)   


 


O2 Sat by Pulse  98 97





Oximetry   














  03/14/17 03/14/17 03/14/17





  16:45 16:52 16:59


 


Temperature   


 


Pulse Rate 74 73 


 


Respiratory 12 13 





Rate   


 


Blood Pressure 148/76  





(mmHg)   


 


O2 Sat by Pulse 97 98 96





Oximetry   














  03/14/17 03/14/17 03/14/17





  17:00 17:15 17:30


 


Temperature   


 


Pulse Rate 79 84 77


 


Respiratory 18 17 15





Rate   


 


Blood Pressure 171/153 144/74 134/89





(mmHg)   


 


O2 Sat by Pulse 98 96 98





Oximetry   














  03/14/17 03/14/17 03/14/17





  17:45 18:00 18:15


 


Temperature   


 


Pulse Rate 80 80 80


 


Respiratory 18 16 20





Rate   


 


Blood Pressure 149/72 105/77 129/82





(mmHg)   


 


O2 Sat by Pulse 98 99 96





Oximetry   














  03/14/17 03/14/17 03/14/17





  18:30 18:46 19:00


 


Temperature   


 


Pulse Rate 82 81 80


 


Respiratory 15 17 10





Rate   


 


Blood Pressure 137/99 125/62 117/56





(mmHg)   


 


O2 Sat by Pulse 92 95 94





Oximetry   














  03/14/17 03/14/17 03/14/17





  19:21 19:30 19:44


 


Temperature   


 


Pulse Rate 78 82 


 


Respiratory 14 14 





Rate   


 


Blood Pressure 132/66 126/74 





(mmHg)   


 


O2 Sat by Pulse 95 95 95





Oximetry   














  03/14/17 03/14/17 03/14/17





  19:45 20:00 20:30


 


Temperature  98.5 F 


 


Pulse Rate 80 80 82


 


Respiratory 15 17 15





Rate   


 


Blood Pressure 126/70 127/64 





(mmHg)   


 


O2 Sat by Pulse 95 96 98





Oximetry   














  03/14/17 03/14/17 03/14/17





  21:00 21:30 22:00


 


Temperature   


 


Pulse Rate 78 78 78


 


Respiratory 13 18 16





Rate   


 


Blood Pressure   123/60





(mmHg)   


 


O2 Sat by Pulse 96 98 94





Oximetry   














  03/14/17 03/14/17 03/14/17





  22:30 23:00 23:05


 


Temperature   


 


Pulse Rate 76 73 74


 


Respiratory 14 18 10





Rate   


 


Blood Pressure   





(mmHg)   


 


O2 Sat by Pulse 97 97 92





Oximetry   














  03/14/17 03/15/17 03/15/17





  23:30 00:00 00:01


 


Temperature  97.1 F 


 


Pulse Rate 72 74 73


 


Respiratory 11 11 12





Rate   


 


Blood Pressure   97/54





(mmHg)   


 


O2 Sat by Pulse 93 91 91





Oximetry   














  03/15/17 03/15/17 03/15/17





  00:30 01:00 01:30


 


Temperature   


 


Pulse Rate 74 72 71


 


Respiratory 16 11 15





Rate   


 


Blood Pressure   





(mmHg)   


 


O2 Sat by Pulse 96 96 97





Oximetry   














  03/15/17 03/15/17 03/15/17





  02:00 02:30 03:00


 


Temperature   


 


Pulse Rate 72 69 71


 


Respiratory 12 13 13





Rate   


 


Blood Pressure 94/54  





(mmHg)   


 


O2 Sat by Pulse 96 97 95





Oximetry   














  03/15/17 03/15/17 03/15/17





  03:30 03:51 04:00


 


Temperature  98.0 F 


 


Pulse Rate 72  77


 


Respiratory 12  20





Rate   


 


Blood Pressure   102/65





(mmHg)   


 


O2 Sat by Pulse 97  95





Oximetry   














  03/15/17 03/15/17 03/15/17





  04:30 05:00 05:30


 


Temperature   


 


Pulse Rate 75 72 72


 


Respiratory 15 17 13





Rate   


 


Blood Pressure   





(mmHg)   


 


O2 Sat by Pulse 95 97 98





Oximetry   














  03/15/17 03/15/17 03/15/17





  06:00 06:30 07:00


 


Temperature   


 


Pulse Rate 75 71 74


 


Respiratory 15 12 11





Rate   


 


Blood Pressure 117/61  





(mmHg)   


 


O2 Sat by Pulse 99 97 96





Oximetry   














  03/15/17 03/15/17 03/15/17





  07:30 07:41 08:00


 


Temperature  98.3 F 


 


Pulse Rate 76  73


 


Respiratory 15  19





Rate   


 


Blood Pressure   99/58





(mmHg)   


 


O2 Sat by Pulse 97  97





Oximetry   














  03/15/17 03/15/17





  08:30 09:00


 


Temperature  


 


Pulse Rate 76 77


 


Respiratory 20 19





Rate  


 


Blood Pressure  





(mmHg)  


 


O2 Sat by Pulse 96 98





Oximetry  











Oxygen Devices in Use Now: None


Appearance: 88 yo M in NAd, aAOx3


Eyes: No Scleral Icterus, PERRLA


Ears/Nose/Mouth/Throat: NL Teeth, Lips, Gums, Mucous Membranes Moist


Neck: NL Appearance and Movements; NL JVP, Trachea Midline


Respiratory: Symmetrical Chest Expansion and Respiratory Effort, Clear to 

Auscultation


Cardiovascular: NL Sounds; No Murmurs; No JVD, RRR


Abdominal: NL Sounds; No Tenderness; No Distention


Lymphatic: No Cervical Adenopathy


Extremities: No Clubbing, Cyanosis, - - R thigh with mild edema, no hemaotma, 

post op dressings not removed


Skin: No Nodules or Sclerosis


Neurological: Alert and Oriented x 3, NL Muscle Strength and Tone


Result Diagrams: 


 03/15/17 05:56





 03/15/17 09:52





Assess/Plan/Problems-Billing


Assessment: 





Mr. Don is an 88 yo male with a PMH of atrial fibrillation, AS s/p 

bioprosthetic aortic valve replacement, OA, and HTN who presented to the ED on 3

/12/17 s/p fall and subsequent right hip fracture.











- Patient Problems


(1) Fracture of femoral neck, right


Comment: S/p R hip ORIF by Dr. Schuster on 3/14/17 with a few short bursts of A. 

fib intraop. no evidence of a. fib on telem. will transfer to [facility].





   





(2) Hyponatremia


Comment: chronic dating back to 2014,  worse post op. stop IVF, monitor   





(3) Atrial fibrillation


Comment: Post op valve replacement last year and a few short episodes intraop 

on 3/14/17


Now in NSR


Continue sotalol and diltiazem.


Xarelto held prior to surgery-to be restarted once Dr. Schuster agrees to do so 

post op.


   





(4) Hypertension


Comment: Controlled, continue diltiazem   





(5) S/P aortic valve replacement


Comment: In May 2016, valve functioning normally on Echo 11/2016   





(6) DVT prophylaxis


Comment: heparin sc as per orthopedic surgery.   


Status and Disposition: 





Inpatient admission. Anticipate LOS >2 days.

## 2017-03-15 NOTE — OP
CC:  Dr. Stone

 

OPERATIVE REPORT:

 

DATE OF OPERATION:  03/14/17

 

DATE OF BIRTH:  [birth year]

 

SURGICAL CARE:  Right hip

 

SURGEON:  Vadim Schuster MD

 

ASSISTANTS:

1.  AUGUSTO Zimmerman, first assistant.

2.  AUGUSTO Hill

 

ANESTHESIOLOGIST:  Dr. Duncan Aleman.

 

ANESTHESIA:  Spinal with Duramorph and IV sedation.

 

PRE-OP DIAGNOSIS:  Displaced right femoral neck fracture.

 

POST-OP DIAGNOSIS:  Displaced right femoral neck fracture.

 

OPERATIVE PROCEDURE:  Right total hip replacement.

 

COMPLICATIONS:  There were no complications.

 

DRAINS:  There were no drains.

 

BLOOD LOSS:  300 mL.

 

REPLACEMENT:  Crystalloid fluid.

 

INDICATIONS:  Displaced femoral neck fracture.  The surgical options of partial 
hip replacement, total hip replacement were reviewed with the patient and his 
family. Complications of each were also reviewed and we recommended and the 
patient elected to proceed with a hip replacement rather than a hemiarthroplasty
, the main reason being more reliable pain relief.

 

DESCRIPTION OF PROCEDURE:  The patient was brought to the operating room and 
placed on the operating room table in a supine position, then into a sitting 
position for administration of the spinal anesthetic.  Once this had been 
completed, he was returned to the supine position.  A Buckley catheter was 
inserted.  The patient was placed in the left lateral position.  Downside, left 
axilla was padded.  Downside, left leg was checked to see if there is no undue 
pressure on the peroneal nerve at the fibular head and neck.  The patient was 
needed to be cleaned on the perineum and rectal region and this was carefully 
completed.  The groin was then sealed off and the pelvis was secured over the 
ASIS and the sacrum with hip positioner, blankets were placed between the legs 
and the right hip and the right lower extremity and given a preliminary 
chlorhexidine prep in the region of the hip, and then a final ChloraPrep for 
the right hip to the foot.  After prepping, draping, and carefully sealing off, 
we did our universal protocol time-out confirming Alberto Don and the plan 
for a right hip replacement.  We all agreed and we proceeded. The incision went 
from the greater trochanter distally for an inch and a half to two inches and 
curving proximally and posteriorly towards the posterior iliac spine for 2.5 
inches.  Skin and subcu divided down to the deep fascia and the deep fascia was 
opened in line with the skin incision.  Careful hemostasis checked and achieved 
throughout the case with electrocautery.  The Charnley retractor was inserted.  
The trochanteric bursa was thickened and some of this was excised.  The 
posterior border of the gluteus medius and minimus were then retracted 
anteriorly with a blunt Hohmann retractor and this exposed the piriformis 
tendon.  The piriformis tendon was cut along its anterior superior margin down 
to the hip capsule and the piriformis was then released from its piriformis 
fossa insertion and the piriformis and the underlying capsular flap was marked 
with a #2 Surgidac suture.  A second suture was placed in the conjoint tendon 
just inferior to the piriformis tendon.  A careful posterior approach to the 
hip was then completed with careful hemostasis. The patient's comminuted 
displaced femoral neck fracture was obvious from this exposure.

 

A drill was placed into the femoral head and then a corkscrew was placed in the 
femoral head.  The femoral head was levered out without any difficulty leaving 
the comminuted femoral neck.  The comminuted pieces were removed with a rongeur 
and then a smooth cut was made on the femoral neck about a fingerbreadth 
proximal to the lesser trochanter and this portion was also removed leaving a 
nice calcar and femoral neck for femoral component placement.

 

The retraction for the acetabulum was done after removing the remaining labrum 
posteriorly, superiorly, and anteriorly with a sharp Hohmann anteriorly, sharp 
right Hohmann posteriorly, a blunt Hohmann inferiorly and superiorly.  The 
acetabular soft tissues were excised after using a curette and the reaming was 
then done 46 through 52 in 1 mm increments.  We had nice bleeding subchondral 
and cancellous bone.

 

The acetabulum was cleaned several times with pulsed saline.  A 52 mm cluster 
hole cup was impacted into position in 45 degrees of abduction and 20 degrees 
of anteversion with nice tight fit.  The screw was placed superiorly and an 
elevated liner was placed posteriorly for a 52 cup and a 36 head.  The 
acetabulum was then cleaned with pulsed saline and then a clean lap sponge was 
inserted.  On the femoral side, we used a canal finder.  Box osteotome, 
broaching was done 5 through 12.5.  At 12.5, we had a nice tight fit.  The 
broach was placed at15 to 20 degrees of anteversion.  A trial reduction was 
done with a standard neck and a +0 head with good soft tissue tension, a 
negative push, pull in extension.  No tendency towards levering with IR and ER.
  Hip extension was approximately neutral and looked like it would all allow 
extension of the hip.

 

The hip was flexed to 90 degrees and then dislocation did not occur prior to 30 
degrees of adduction and internal rotation.

 

The 12.5 standard M/L taper component was then impacted into position in 
approximately 15 to 20 degrees of anteversion.  A +0 36 mm Cobalt chrome head 
was impacted into position on the clean and dried trunnion with nice fit.  The 
hip was reduced.  Hemostasis was checked and achieved throughout closure 
especially in the posterior tissues.  I did not think drains were necessary.  
The piriformis and conjoint tendons were approximated through 2 drill holes to 
the posterior superior greater trochanter.  The fascia franny was closed with 
interrupted #1 Polysorb in figure-of-eight fashion, the same with the fascia of 
the gluteus anette.  The deep and superficial subcu were closed with 
interrupted 0 and then 2-0 Polysorb on the superficial subcu, and then staples 
on the skin.  Irrigation was done several times during closure with saline.  
Dressing was done after washing and drying over the staples with Betadine-
soaked release, sterile gauze, and an ABD pads, and then paper tape.  The 
patient was returned to the supine position in the hospital bed in the recovery 
room in stable and satisfactory condition having tolerated the procedure very 
well.  The posterior tibial pulse and dorsal pedis pulses were present at the 
end of the case.  The patient was having a tendency to externally rotate the leg
, so we put a pillow under the knee and placed a roll blanket along the lateral 
thigh and knee to hold the toes more towards the ceiling rather than the 
externally rotated position.  The patient returned to the recovery room in 
stable and satisfactory condition having tolerated the procedure very well.

 

 [id number]/CPS #: [id number]

ZELDA

## 2017-03-16 ENCOUNTER — HOSPITAL ENCOUNTER (INPATIENT)
Dept: HOSPITAL 25 - PMRU | Age: 82
LOS: 12 days | Discharge: HOME | DRG: 561 | End: 2017-03-28
Attending: PHYSICAL MEDICINE & REHABILITATION | Admitting: PHYSICAL MEDICINE & REHABILITATION
Payer: MEDICARE

## 2017-03-16 VITALS — SYSTOLIC BLOOD PRESSURE: 136 MMHG | DIASTOLIC BLOOD PRESSURE: 62 MMHG
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
## 2017-03-16 DIAGNOSIS — S72.001D: Primary | ICD-10-CM

## 2017-03-16 DIAGNOSIS — I10: ICD-10-CM

## 2017-03-16 DIAGNOSIS — Z88.8: ICD-10-CM

## 2017-03-16 DIAGNOSIS — Z79.01: ICD-10-CM

## 2017-03-16 DIAGNOSIS — M06.9: ICD-10-CM

## 2017-03-16 DIAGNOSIS — Z95.2: ICD-10-CM

## 2017-03-16 DIAGNOSIS — Z79.899: ICD-10-CM

## 2017-03-16 DIAGNOSIS — I48.0: ICD-10-CM

## 2017-03-16 DIAGNOSIS — Z96.641: ICD-10-CM

## 2017-03-16 LAB
ANION GAP SERPL CALC-SCNC: 5 MMOL/L (ref 2–11)
BUN SERPL-MCNC: 16 MG/DL (ref 6–24)
BUN/CREAT SERPL: 24.2 (ref 8–20)
CALCIUM SERPL-MCNC: 8.4 MG/DL (ref 8.6–10.3)
CHLORIDE SERPL-SCNC: 95 MMOL/L (ref 101–111)
GLUCOSE SERPL-MCNC: 98 MG/DL (ref 70–100)
HCO3 SERPL-SCNC: 28 MMOL/L (ref 22–32)
HCT VFR BLD AUTO: 30 % (ref 42–52)
HGB BLD-MCNC: 10.6 G/DL (ref 14–18)
POTASSIUM SERPL-SCNC: 3.7 MMOL/L (ref 3.5–5)
SODIUM SERPL-SCNC: 128 MMOL/L (ref 133–145)

## 2017-03-16 PROCEDURE — F07Z8ZZ TRANSFER TRAINING TREATMENT: ICD-10-PCS | Performed by: PHYSICAL MEDICINE & REHABILITATION

## 2017-03-16 PROCEDURE — 80053 COMPREHEN METABOLIC PANEL: CPT

## 2017-03-16 PROCEDURE — 85027 COMPLETE CBC AUTOMATED: CPT

## 2017-03-16 PROCEDURE — F08Z3ZZ FEEDING/EATING TREATMENT: ICD-10-PCS | Performed by: PHYSICAL MEDICINE & REHABILITATION

## 2017-03-16 PROCEDURE — F08Z1ZZ DRESSING TECHNIQUES TREATMENT: ICD-10-PCS | Performed by: PHYSICAL MEDICINE & REHABILITATION

## 2017-03-16 PROCEDURE — F07Z9ZZ GAIT TRAINING/FUNCTIONAL AMBULATION TREATMENT: ICD-10-PCS | Performed by: PHYSICAL MEDICINE & REHABILITATION

## 2017-03-16 PROCEDURE — F08Z0ZZ BATHING/SHOWERING TECHNIQUES TREATMENT: ICD-10-PCS | Performed by: PHYSICAL MEDICINE & REHABILITATION

## 2017-03-16 PROCEDURE — 85025 COMPLETE CBC W/AUTO DIFF WBC: CPT

## 2017-03-16 PROCEDURE — F07Z5ZZ BED MOBILITY TREATMENT: ICD-10-PCS | Performed by: PHYSICAL MEDICINE & REHABILITATION

## 2017-03-16 PROCEDURE — 36415 COLL VENOUS BLD VENIPUNCTURE: CPT

## 2017-03-16 RX ADMIN — DOCUSATE SODIUM SCH: 100 CAPSULE, LIQUID FILLED ORAL at 21:00

## 2017-03-16 RX ADMIN — POTASSIUM CHLORIDE SCH MEQ: 750 TABLET, FILM COATED, EXTENDED RELEASE ORAL at 08:44

## 2017-03-16 RX ADMIN — DILTIAZEM HYDROCHLORIDE SCH MG: 240 CAPSULE, COATED, EXTENDED RELEASE ORAL at 08:44

## 2017-03-16 RX ADMIN — FUROSEMIDE SCH MG: 20 TABLET ORAL at 08:44

## 2017-03-16 RX ADMIN — Medication SCH CAP: at 21:05

## 2017-03-16 RX ADMIN — SOTALOL HYDROCHLORIDE SCH MG: 80 TABLET ORAL at 17:47

## 2017-03-16 RX ADMIN — SENNOSIDES SCH: 8.6 TABLET, FILM COATED ORAL at 21:00

## 2017-03-16 RX ADMIN — HEPARIN SODIUM SCH: 5000 INJECTION INTRAVENOUS; SUBCUTANEOUS at 08:53

## 2017-03-16 RX ADMIN — DOCUSATE SODIUM SCH MG: 100 CAPSULE, LIQUID FILLED ORAL at 08:44

## 2017-03-16 RX ADMIN — ACETAMINOPHEN PRN MG: 325 TABLET ORAL at 21:44

## 2017-03-16 RX ADMIN — Medication SCH CAP: at 08:44

## 2017-03-16 RX ADMIN — BIMATOPROST SCH DROP: 0.1 SOLUTION/ DROPS OPHTHALMIC at 18:42

## 2017-03-16 RX ADMIN — SOTALOL HYDROCHLORIDE SCH MG: 80 TABLET ORAL at 08:44

## 2017-03-16 RX ADMIN — ACETAMINOPHEN SCH: 325 TABLET ORAL at 08:45

## 2017-03-16 NOTE — HP
HISTORY AND PHYSICAL:

 

DATE OF ADMISSION:  03/16/17

 

REASON FOR ADMISSION:  Right hip fracture.

 

HISTORY OF PRESENT ILLNESS:  Alberto Don is an 87-year-old white male.  He 
has a medical history significant for paroxysmal atrial fibrillation as well as 
an aortic valve replacement in May of 2016.  He also has rheumatoid arthritis, 
which is quiescent at the present time.  The patient was getting into his car 
to go to [place] on 03/12/17.  He was brushing the snow off his car when he lost 
his balance and fell to the ground.  He had immediate pain on his right hip.  
Workers from [place] came and helped him up.  He called his son and the son 
came to get him. He was brought inside and then put in his son's car and 
brought to the hospital. He was taken to the emergency room at [facility].  X-rays were done, which showed a right displaced femoral neck 
fracture.  Because he normally takes Xarelto, they had to wait before he could 
be taken to surgery.  He was seen by Dr. Schuster and taken to the operating room 
on March 14th and underwent a right total hip replacement.  Postoperatively, 
his course has been benign.  His Xarelto was restarted this morning.  He is 
felt to have physical therapy and occupational therapy needs.  He is now being 
admitted for inpatient rehab, so that he might return to independent living.

 

PAST MEDICAL HISTORY:  Notable for the aforementioned aortic valve replacement, 
as well as atrial fibrillation.  He has a history of hypertension as well.

 

CURRENT MEDICATIONS:  Include:

1.  Cardizem CD.

2.  He also takes Xarelto.

3.  Lasix.

4.  Betapace.

5.  Lumigan eye drops.

6.  He is on Percocet for pain control.

 

ALLERGIES:  TETRACYCLINES.

 

SOCIAL HISTORY:  The patient lives at [place] with his wife.  They have some 
aide time.  He is a nonsmoker.  He has half a glass of wine in the evening.  He 
has 4 children.  His healthcare proxy is his son, Jose Don.

 

REVIEW OF SYSTEMS:  No current shortness of breath or chest pain.

 

                               PHYSICAL EXAMINATION

 

VITAL SIGNS:  The patient's temperature is 97.7, blood pressure is 120/51, 
pulse is 84, and respirations 22.

 

HEENT:  Extraocular movements are intact.  Tongue is midline.

 

NECK:  Supple.

 

LUNGS:  Sound clear to auscultation bilaterally.

 

HEART:  Sounds were regular, S1 and S2 audible.

 

ABDOMEN:  Soft and nontender.

 

EXTREMITIES:  His right hip has a wound, which is clean and dry.  Peripheral 
pulses were intact.

 

NEUROLOGIC:  He is awake, alert, and oriented.  Muscle strength is 5/5 except 
the right leg, which is 3/5 secondary to pain.

 

FUNCTIONAL EXAM:  The patient transfers with min-to-mod assist.

 

 ASSESSMENT:  Right hip fracture, status post right total hip replacement.

 

PLAN:  Our plan is to integrate him into a comprehensive and therapeutic rehab 
program with the following goals:

 

1.  Physical Therapy will work with the patient.  They are going to work on 
functional transfer training, ambulation training with a walker.

2.  Occupational Therapy will see the patient and work on his activities of 
daily living, including toileting and toilet transfers.

3.  Xarelto for atrial fibrillation as well as DVT prophylaxis.

4.  Continue Betapace and Cardizem for atrial fibrillation.

5.  Continue Lasix and potassium supplementation.

6.  Advance directives:  The patient is a full code.

7.  Family training as appropriate.

8.   will be closely involved to make sure that any services and 
equipment that the patient requires are in place prior to discharge.

9.  Home with appropriate services.

 

ESTIMATED LENGTH OF STAY:  Ten days.

 



[id number]/CPS #: [id number]

MTDD

## 2017-03-16 NOTE — DS
DISCHARGE SUMMARY:

 

DATE OF ADMISSION:  03/12/17

 

DATE OF DISCHARGE:  Discharge from acute care facility and transfer to our 
physiotherapy unit, 03/16/17.

 

PRIMARY CARE PROVIDER:  Dr. Stone.

 

DISCHARGE DIAGNOSES:  Status post mechanical fall and right hip fracture as 
well as status post right total hip replacement performed by Dr. Schuster on 03/14/
17.

 

SECONDARY DIAGNOSES:

1.  History of paroxysmal atrial fibrillation/flutter.

2.  History of aortic stenosis, status post bioprosthetic valve replacement in 
May 2016.

3.  Osteoarthritis.

4.  Hypotension.

5.  Chronic hyponatremia.

 

MEDICATIONS AT DISCHARGE:  Include:

1.  Acetaminophen 975 mg p.o. b.i.d.

2.  Lumigan eyedrops 0.01% 1 drop bilateral eyes q.p.m.

3.  Dulcolax suppository 10 mg p.o. daily p.r.n. constipation.

4.  Diltiazem  mg daily.

5.  Colace 100 mg b.i.d.

6.  Lasix 20 mg daily.

7.  Potassium chloride 10 mEq daily.

8.  Xarelto 15 mg daily.

9.  Sotalol 40 mg b.i.d.

 

CONSULTATIONS DURING THE HOSPITAL STAY:  Included Dr. Ashraf and Dr. Schuster from 
Orthopedic Surgery.

 

PROCEDURE PERFORMED:  On 03/14/17, right total hip replacement performed by Dr. Schuster.

 

HOSPITALIZATION COURSE:  Alberto Don is an 87-year-old male with history of 
paroxysmal atrial fibrillation and status post aortic valve replacement who 
presented after a mechanical fall with right hip fracture.  Due to the patient 
being on Xarelto, his surgery was slightly delayed and was performed on 03/14/
17 by Dr. Schuster.  That was right total hip replacement.  Postoperatively, the 
patient is very well.  He developed mild postoperative anemia and was 
transfused 1 unit of packed red blood cells.  His hemoglobin remained stable 
and is 10.6 at discharge. The patient was deemed to be a good candidate for our 
physiotherapy unit and he is going to be transferred there today for further 
rehabilitation.

 

Please note that intraoperatively the patient had a couple of short bursts of 
atrial fibrillation/flutter that resolved spontaneously.

 

Please note the patient's Xarelto was held throughout the patient's hospital 
stay and restarted on the day of discharge after discussion and agreement with 
Dr. Schuster.

 

LABORATORY DATA AT DISCHARGE:  Includes hemoglobin 10.6, hematocrit of 30.

 

Sodium was 128, potassium 3.7, chloride 95, carbon dioxide 28, BUN 16, 
creatinine 0.66.

 

Wound care as per total hip protocol at [facility].  The patient is weightbearing as 
tolerated on the right hip.

 

PHYSICAL EXAMINATION AT THE TIME OF DISCHARGE:  Vital Signs:  Blood pressure 136
/62, heart rate 78 and regular, respiratory rate 16, oxygen saturation 98% on 
room air, temperature 97.8.  General:  The patient is a very pleasant 87-year-
old male who is in no acute distress.  Alert, awake and oriented x3.  HEENT:  
Head atraumatic, normocephalic.  Eyes:  Pupils are equal to light and 
accommodation. Oropharynx clear.  Mucosa moist.  Neck:  Supple.  No JVD.  No 
bruits bilaterally. Cardiovascular:  Regular rate and rhythm.  No murmur.  
Respiratory:  Clear to auscultation bilaterally.  Abdomen: Soft, nontender.  
Bowel sounds present in all 4 quadrants.  Extremities:  There is no edema.  
Pulses are 2+ bilaterally.  No clubbing or cyanosis.  Evaluation of the skin, 
the postoperative thigh area has no hematoma.  Direct postsurgical dressings 
that are applied to the wound were not covered during the evaluation.  The 
dressings are going to be changed prior to discharge by the patient's surgeon.

 

Please note that this is a short summary of the patient's hospital stay.  
Please refer to further medical records for details.

 

TIME SPENT:  Approximately 40 minutes was spent on the patient's discharge.



CC:  Dr. Stone; Dr. Schuster; Dr. Gupta, Dr. Ashraf* 

 

 [id number]/CPS #: [id number]

MTDD

## 2017-03-17 LAB
ALBUMIN SERPL BCG-MCNC: 3.2 G/DL (ref 3.2–5.2)
ALP SERPL-CCNC: 111 U/L (ref 34–104)
ALT SERPL W P-5'-P-CCNC: 70 U/L (ref 7–52)
ANION GAP SERPL CALC-SCNC: 6 MMOL/L (ref 2–11)
AST SERPL-CCNC: 85 U/L (ref 13–39)
BUN SERPL-MCNC: 17 MG/DL (ref 6–24)
BUN/CREAT SERPL: 25.4 (ref 8–20)
CALCIUM SERPL-MCNC: 8.6 MG/DL (ref 8.6–10.3)
CHLORIDE SERPL-SCNC: 95 MMOL/L (ref 101–111)
GLOBULIN SER CALC-MCNC: 3.1 G/DL (ref 2–4)
GLUCOSE SERPL-MCNC: 107 MG/DL (ref 70–100)
HCO3 SERPL-SCNC: 28 MMOL/L (ref 22–32)
HCT VFR BLD AUTO: 30 % (ref 42–52)
HGB BLD-MCNC: 10.4 G/DL (ref 14–18)
MCH RBC QN AUTO: 33 PG (ref 27–31)
MCHC RBC AUTO-ENTMCNC: 34 G/DL (ref 31–36)
MCV RBC AUTO: 97 FL (ref 80–94)
POTASSIUM SERPL-SCNC: 4.2 MMOL/L (ref 3.5–5)
PROT SERPL-MCNC: 6.3 G/DL (ref 6.4–8.9)
RBC # BLD AUTO: 3.13 10^6/UL (ref 4–5.4)
SODIUM SERPL-SCNC: 129 MMOL/L (ref 133–145)
WBC # BLD AUTO: 9.8 10^3/UL (ref 3.5–10.8)

## 2017-03-17 RX ADMIN — SENNOSIDES SCH: 8.6 TABLET, FILM COATED ORAL at 17:46

## 2017-03-17 RX ADMIN — Medication SCH CAP: at 17:47

## 2017-03-17 RX ADMIN — ACETAMINOPHEN PRN MG: 325 TABLET ORAL at 03:51

## 2017-03-17 RX ADMIN — Medication SCH CAP: at 09:13

## 2017-03-17 RX ADMIN — FUROSEMIDE SCH MG: 20 TABLET ORAL at 09:13

## 2017-03-17 RX ADMIN — SOTALOL HYDROCHLORIDE SCH MG: 80 TABLET ORAL at 09:14

## 2017-03-17 RX ADMIN — BIMATOPROST SCH DROP: 0.1 SOLUTION/ DROPS OPHTHALMIC at 17:47

## 2017-03-17 RX ADMIN — DOCUSATE SODIUM SCH: 100 CAPSULE, LIQUID FILLED ORAL at 23:03

## 2017-03-17 RX ADMIN — DILTIAZEM HYDROCHLORIDE SCH MG: 240 CAPSULE, COATED, EXTENDED RELEASE ORAL at 09:14

## 2017-03-17 RX ADMIN — SOTALOL HYDROCHLORIDE SCH MG: 80 TABLET ORAL at 17:40

## 2017-03-17 RX ADMIN — RIVAROXABAN SCH MG: 15 TABLET, FILM COATED ORAL at 09:13

## 2017-03-17 RX ADMIN — DOCUSATE SODIUM SCH MG: 100 CAPSULE, LIQUID FILLED ORAL at 09:13

## 2017-03-17 RX ADMIN — DOCUSATE SODIUM SCH MG: 100 CAPSULE, LIQUID FILLED ORAL at 17:47

## 2017-03-17 RX ADMIN — ACETAMINOPHEN PRN MG: 325 TABLET ORAL at 21:15

## 2017-03-17 RX ADMIN — POTASSIUM CHLORIDE SCH MEQ: 750 TABLET, FILM COATED, EXTENDED RELEASE ORAL at 09:13

## 2017-03-17 NOTE — PMRUTEAM
PMRU: Goals


Current Status: 


 Nursing: Current Status











Skin Deviations [Bilateral     Bruise





Hand]                          


 


Skin Deviations [Right Hip]    Incision


 


Skin Deviation Description [   staples intact to R hip incision, small amount of





Right Hip]                     bloody drainage noted











 Physical Therapy: Current Status











Bed Mobility Assistance        Not Tested


 


Transfer Moblility Assistance  Contact Guard Assist,Min Assist


 


Transfer/Bed Mobility          Rolling Walker





Recommended Devices            


 


Ambulation Assistance          Supervision,Contact Guard Assist


 


Ambulation Assistive Devices   Rolling Walker


 


Number of Feet Patient         20' x 2





Ambulated                      


 


Stairs Assistance              Not Tested


 


Stairs Recommended Devices     Two Rails


 


Number of Stairs               6


 


Curb                           Not Tested














 Rec Therapy: Current Status











Summary of Assessment and      Pt. is aware of RT services and RT assessment is





Clinical Impression            complete.  Pt. was pleasant, cooperative, open to





 leisure visits, and requested crossword puzzles





 which were provided to him.














 Social Work: Current Status











Discharge Plan                 return home to [place] [facility] apartment w/





 home care svs and family support


 


Potential for Family Training  pt's family is involved and attentive


 


Anticipated Discharge          Home





Destination                    


 


Discharge With                 [facility]NS and family support

















Goals: 


 Physical Therapy: Updated Goals











Transfer/Bed Mobility          Independent with Rolling Walker





Recommended Devices            














 Social Work: Goals











Discharge Plan                 return home to [place] [facility] apartment w/





 home care svs and family support


 


Potential for Family Training  pt's family is involved and attentive


 


Anticipated Discharge          Home





Destination                    


 


Discharge With                 VNS and family support














OCCUPATIONAL THERAPY: Mod Independent with ADLs 


Care Plan: 


 Care Plan





Communication-Improve/Maintain          Start:  03/16/17 22:02              


Freq:   QSHIFT                          Status: Active      Target:         








Activity Type Activity Date Activity User E-Sign Co-Sign Detail





Recorded Client Recorded Date Recorded By   


 


Document 03/16/17 22:02 [id number]   





PMRU-M10 03/17/17 01:10 [id number]   














  03/16/17





  22:02


 


PMRU Outcome: Communication/Cognitive 





Status 


 


Outcome/Goals Makes Needs





 Known





 Effectively


 


Progression Toward Outcomes/Goals Progressing








Mobility- Improve/Maintain              Start:  03/16/17 11:44              


Freq:   QSHIFT                          Status: Active      Target:         








Activity Type Activity Date Activity User E-Sign Co-Sign Detail





Recorded Client Recorded Date Recorded By   


 


Document 03/17/17 12:17 [id number]   





PMRU-C08 03/17/17 12:17 [id number]   














  03/17/17





  12:17


 


PMRU Outcome: Mobility 


 


Physical Therapy Evaluation and Yes





Treatment 


 


Activity OOB with Assistance Yes


 


WBAT Yes


 


Device Yes


 


Assistance Yes


 


Patient to be seen 5x/wk for  min/ Therex





day for: Mobility





 Training





 Gait Training





 Balance


 


Other Therapy Comment Hip precautions


 


Outcome/Goals Maintain/





 Achieve





 Baseline





 Mobility Status





 Improve





 Mobility Status





 Demonstrates





 Proper Use of





 Assistive





 Devices





 Free from





 Complications





 of Immobility


 


Progression Toward Outcome/Goals Progressing


 


Bed Mobility Yes:





 Independent


 


Transfers Yes: Modified





 Independent





 with RW


 


Gait x ft Yes: Modified





 Independent 150





 ' with RW


 


Up/Down Stairs Yes:





 Independent 6





 steps 2 rails


 


With HEP Yes:





 Independent


 


Goal Comment Recall 3/3 hip





 precautions





 independently








Pain/Comfort-Improve/Maintain           Start:  03/16/17 22:03              


Freq:   QSHIFT                          Status: Active      Target:         








Activity Type Activity Date Activity User E-Sign Co-Sign Detail





Recorded Client Recorded Date Recorded By   


 


Document 03/17/17 02:07 [id number]   





PMRU-M10 03/17/17 02:07 [id number]   














  03/17/17





  02:07


 


Outcome: Pain/Comfort 


 


Outcome/Goals Demonstrates





 Knowledge and





 Use of





 Available





 Comfort





 Measures





 Achieves





 Acceptable





 Comfort/Pain





 Level as





 Determined by





 Patient/Condit


 


Progression Toward Outcome/Goals Progressing














Medicine Note: 








Length of Stay:  10 days





Anticipated Discharge Destination: Home





Tentative Discharge Date:  03/28/17





Discharged to:  Home

## 2017-03-18 RX ADMIN — ACETAMINOPHEN PRN MG: 325 TABLET ORAL at 21:35

## 2017-03-18 RX ADMIN — DOCUSATE SODIUM SCH MG: 100 CAPSULE, LIQUID FILLED ORAL at 20:24

## 2017-03-18 RX ADMIN — FUROSEMIDE SCH MG: 20 TABLET ORAL at 09:10

## 2017-03-18 RX ADMIN — RIVAROXABAN SCH MG: 15 TABLET, FILM COATED ORAL at 09:11

## 2017-03-18 RX ADMIN — SOTALOL HYDROCHLORIDE SCH MG: 80 TABLET ORAL at 17:40

## 2017-03-18 RX ADMIN — SOTALOL HYDROCHLORIDE SCH MG: 80 TABLET ORAL at 09:13

## 2017-03-18 RX ADMIN — Medication SCH CAP: at 20:24

## 2017-03-18 RX ADMIN — SENNOSIDES SCH: 8.6 TABLET, FILM COATED ORAL at 20:27

## 2017-03-18 RX ADMIN — Medication SCH CAP: at 09:13

## 2017-03-18 RX ADMIN — BIMATOPROST SCH DROP: 0.1 SOLUTION/ DROPS OPHTHALMIC at 17:41

## 2017-03-18 RX ADMIN — Medication SCH: at 00:16

## 2017-03-18 RX ADMIN — DOCUSATE SODIUM SCH: 100 CAPSULE, LIQUID FILLED ORAL at 09:11

## 2017-03-18 RX ADMIN — DILTIAZEM HYDROCHLORIDE SCH MG: 240 CAPSULE, COATED, EXTENDED RELEASE ORAL at 09:10

## 2017-03-18 RX ADMIN — SENNOSIDES SCH: 8.6 TABLET, FILM COATED ORAL at 00:16

## 2017-03-18 RX ADMIN — POTASSIUM CHLORIDE SCH MEQ: 750 TABLET, FILM COATED, EXTENDED RELEASE ORAL at 09:11

## 2017-03-19 LAB
HCT VFR BLD AUTO: 30 % (ref 42–52)
HGB BLD-MCNC: 10.1 G/DL (ref 14–18)
MCH RBC QN AUTO: 33 PG (ref 27–31)
MCHC RBC AUTO-ENTMCNC: 34 G/DL (ref 31–36)
MCV RBC AUTO: 99 FL (ref 80–94)
RBC # BLD AUTO: 3.02 10^6/UL (ref 4–5.4)
WBC # BLD AUTO: 9.6 10^3/UL (ref 3.5–10.8)

## 2017-03-19 RX ADMIN — POTASSIUM CHLORIDE SCH MEQ: 750 TABLET, FILM COATED, EXTENDED RELEASE ORAL at 08:22

## 2017-03-19 RX ADMIN — SOTALOL HYDROCHLORIDE SCH MG: 80 TABLET ORAL at 08:23

## 2017-03-19 RX ADMIN — DOCUSATE SODIUM SCH: 100 CAPSULE, LIQUID FILLED ORAL at 08:25

## 2017-03-19 RX ADMIN — Medication SCH CAP: at 21:03

## 2017-03-19 RX ADMIN — BIMATOPROST SCH DROP: 0.1 SOLUTION/ DROPS OPHTHALMIC at 18:53

## 2017-03-19 RX ADMIN — Medication SCH CAP: at 08:24

## 2017-03-19 RX ADMIN — FUROSEMIDE SCH MG: 20 TABLET ORAL at 08:22

## 2017-03-19 RX ADMIN — RIVAROXABAN SCH MG: 15 TABLET, FILM COATED ORAL at 08:23

## 2017-03-19 RX ADMIN — DILTIAZEM HYDROCHLORIDE SCH MG: 240 CAPSULE, COATED, EXTENDED RELEASE ORAL at 08:22

## 2017-03-19 RX ADMIN — SOTALOL HYDROCHLORIDE SCH MG: 80 TABLET ORAL at 17:21

## 2017-03-19 RX ADMIN — DOCUSATE SODIUM SCH MG: 100 CAPSULE, LIQUID FILLED ORAL at 21:03

## 2017-03-19 RX ADMIN — SENNOSIDES SCH: 8.6 TABLET, FILM COATED ORAL at 21:04

## 2017-03-19 RX ADMIN — ACETAMINOPHEN PRN MG: 325 TABLET ORAL at 22:15

## 2017-03-20 RX ADMIN — FUROSEMIDE SCH MG: 20 TABLET ORAL at 09:17

## 2017-03-20 RX ADMIN — DILTIAZEM HYDROCHLORIDE SCH MG: 240 CAPSULE, COATED, EXTENDED RELEASE ORAL at 09:17

## 2017-03-20 RX ADMIN — ACETAMINOPHEN PRN MG: 325 TABLET ORAL at 23:04

## 2017-03-20 RX ADMIN — DOCUSATE SODIUM SCH: 100 CAPSULE, LIQUID FILLED ORAL at 20:49

## 2017-03-20 RX ADMIN — Medication SCH CAP: at 09:16

## 2017-03-20 RX ADMIN — BIMATOPROST SCH DROP: 0.1 SOLUTION/ DROPS OPHTHALMIC at 18:28

## 2017-03-20 RX ADMIN — POTASSIUM CHLORIDE SCH MEQ: 750 TABLET, FILM COATED, EXTENDED RELEASE ORAL at 09:17

## 2017-03-20 RX ADMIN — SENNOSIDES SCH: 8.6 TABLET, FILM COATED ORAL at 20:49

## 2017-03-20 RX ADMIN — DOCUSATE SODIUM SCH: 100 CAPSULE, LIQUID FILLED ORAL at 09:16

## 2017-03-20 RX ADMIN — SOTALOL HYDROCHLORIDE SCH MG: 80 TABLET ORAL at 09:17

## 2017-03-20 RX ADMIN — RIVAROXABAN SCH MG: 15 TABLET, FILM COATED ORAL at 09:16

## 2017-03-20 RX ADMIN — SOTALOL HYDROCHLORIDE SCH MG: 80 TABLET ORAL at 17:29

## 2017-03-20 RX ADMIN — Medication SCH CAP: at 20:49

## 2017-03-21 RX ADMIN — Medication SCH CAP: at 21:42

## 2017-03-21 RX ADMIN — DILTIAZEM HYDROCHLORIDE SCH MG: 240 CAPSULE, COATED, EXTENDED RELEASE ORAL at 08:53

## 2017-03-21 RX ADMIN — ACETAMINOPHEN PRN MG: 325 TABLET ORAL at 22:04

## 2017-03-21 RX ADMIN — SOTALOL HYDROCHLORIDE SCH MG: 80 TABLET ORAL at 08:50

## 2017-03-21 RX ADMIN — FUROSEMIDE SCH MG: 20 TABLET ORAL at 08:53

## 2017-03-21 RX ADMIN — BIMATOPROST SCH DROP: 0.1 SOLUTION/ DROPS OPHTHALMIC at 18:08

## 2017-03-21 RX ADMIN — DOCUSATE SODIUM SCH MG: 100 CAPSULE, LIQUID FILLED ORAL at 08:50

## 2017-03-21 RX ADMIN — SENNOSIDES SCH: 8.6 TABLET, FILM COATED ORAL at 21:37

## 2017-03-21 RX ADMIN — Medication SCH CAP: at 08:53

## 2017-03-21 RX ADMIN — DOCUSATE SODIUM SCH: 100 CAPSULE, LIQUID FILLED ORAL at 21:37

## 2017-03-21 RX ADMIN — SOTALOL HYDROCHLORIDE SCH MG: 80 TABLET ORAL at 17:01

## 2017-03-21 RX ADMIN — POTASSIUM CHLORIDE SCH MEQ: 750 TABLET, FILM COATED, EXTENDED RELEASE ORAL at 08:53

## 2017-03-21 RX ADMIN — RIVAROXABAN SCH MG: 15 TABLET, FILM COATED ORAL at 08:53

## 2017-03-21 NOTE — PMRUTEAM
PMRU: Goals


Current Status: 


 Nursing: Current Status











Skin Deviations [Bilateral     Bruise





Hand]                          


 


Skin Deviations [Right Hip]    Incision


 


Skin Deviation Description [   staples are clean dry and intact





Right Hip]                     











 Physical Therapy: Current Status











Bed Mobility Assistance        Min Assist,Mod Assist,Not Tested


 


Transfer Moblility Assistance  Supervision,Contact Guard Assist


 


Transfer/Bed Mobility          Rolling Walker





Recommended Devices            


 


Ambulation Assistance          Supervision


 


Ambulation Assistive Devices   Rolling Walker


 


Number of Feet Patient         120





Ambulated                      


 


Stairs Assistance              Not Tested


 


Stairs Recommended Devices     Two Rails


 


Number of Stairs               6


 


Curb                           Not Tested














 Occupational Therapy: Current Status











Upper Body Dressing            Min Assist


 


Lower Body Dressing            Mod Assist,Max Asst


 


Bathing                        Mod Assist


 


Toileting                      Max Asst


 


Toilet Transfer                Min Assist


 


Shower Transfer                Min Assist


 


Eating                         Independent














 Rec Therapy: Current Status











Summary of Assessment and      RT assessment complete and pt. is aware of RT





Clinical Impression            services.  Pt. is open to continued leisure 

visits





 . Pt.'s wife visits regularly in the afternoons as





 well.


 


Treatment Goals                Pt. will engage in leisure activities while on 

the





 unit.


 


Treatment Plan                 Provide and encourage involvement in RT services.














 Social Work: Current Status











Discharge Plan                 Return home with home care svs and family support


 


Potential for Family Training  pt's wife & son is involved and supportive


 


Anticipated Discharge          Home





Destination                    


 


Discharge With                 Home care svs and family support














 Nutrition: Current Status











Monitoring                     Pt is eating adequately (%) and meeting





 needs.  Regular bowel pattern (last BM 3/19).  No





 skin breakdown.  s/p R hip replacement - on





 xarelto for anticoagulation.  No ed needs.  Na 129





 .  No specific further nutrition intervention.














Goals: 


 Physical Therapy: Initial Goals











Bed Mobility Assistance        Independent


 


Transfer Mobility Assistance   Independent


 


Transfer/Bed Mobility          Rolling Walker





Recommended Devices            


 


Ambulation                     Independent


 


Ambulation Recommended Devices Rolling Walker


 


Ambulation Distance            150


 


Stairs Assistance              Independent


 


Stair Recommended Devices      Two Rails


 


Number of Stairs               6


 


Home Exercise Program          Independent





Assistance                     














 Physical Therapy: Updated Goals











Bed Mobility Assistance        Independent


 


Transfer Mobility Assistance   Independent


 


Transfer/Bed Mobility          Rolling Walker





Recommended Devices            


 


Ambulation Assistance          Independent


 


Ambulation Assistive Devices   Rolling Walker


 


Ambulation Distance (ft)       150


 


Stairs Assistance              Independent


 


Stairs Recommended Devices     Two Rails


 


Number of Stairs               6


 


Home Exercise Program          Independent





Assistance                     














 Occupational Therapy: Initial Goals











Goals to be Completed in (Days 7-10





)                              


 


Upper Body Bathing Routine     Modified Independent with


 


Lower Body Bathing Routine     Modified Independent with


 


Upper Body Dressing Routine    Modified Independent with


 


Lower Body Dressing Routine    Modified Independent with


 


Toilet Hygeine and Clothing    Modified Independent with





Management Routine             


 


Toilet Transfer Routine        Modified Independent with


 


Step-In Shower Transfer        Modified Independent with





Routine                        


 


Functional Transfers for ADL   Modified Independent with


 


Grooming Routine               Independent


 


Feeding Routine                Independent














 Nutrition: Goals











Intervention Goals             1.  Maintains adequate oral intake to support





 weight maintenance, maintenance of lean body mass.





 2.  Achieves Na level WNL.











 Social Work: Goals











Discharge Plan                 Return home with home care svs and family support


 


Potential for Family Training  pt's wife & son is involved and supportive


 


Anticipated Discharge          Home





Destination                    


 


Discharge With                 Home care svs and family support

















Care Plan: 


 Care Plan





ADL's - Improve/Maintain                Start:  03/17/17 16:41              


Freq:   QSHIFT                          Status: Active      Target:         








Activity Type Activity Date Activity User E-Sign Co-Sign Detail





Recorded Client Recorded Date Recorded By   


 


Document 03/21/17 11:21 [id number]   





PMRU-C09 03/21/17 11:21 [id number]   














  03/21/17





  11:21


 


PMRU Outcome: ADL's/ADL Transfers 


 


Orders/Interventions Occupational





 Therapy





 Evaluation &





 Treatment





 Communication





 Tool in Patient





 Room


 


Device Yes


 


Patient to receive OT 5x/wk for  Therex





min/day Self Care





 Management





 Group Therapy


 


UE/LE ADL's with Assist Yes: Yohana with





 AE


 


ADL Transfers with Assist Yes: Yohana


 


Toileting: Transfers,Clothing Management Yes: Yohana





,Hygeine w/Assist 


 


Progression Toward Outcome/Goals Progressing








Communication-Improve/Maintain          Start:  03/16/17 22:02              


Freq:   QSHIFT                          Status: Active      Target:         








Activity Type Activity Date Activity User E-Sign Co-Sign Detail





Recorded Client Recorded Date Recorded By   


 


Document 03/21/17 05:58 [id number]   





PMRU-M10 03/21/17 05:58 [id number]   














  03/21/17





  05:58


 


PMRU Outcome: Communication/Cognitive 





Status 


 


Outcome/Goals Makes Needs





 Known





 Effectively


 


Progression Toward Outcomes/Goals Progressing








DVT Prophylaxis- Improve/Maintain       Start:  03/17/17 02:06              


Freq:   QSHIFT                          Status: Active      Target:         








Activity Type Activity Date Activity User E-Sign Co-Sign Detail





Recorded Client Recorded Date Recorded By   


 


Document 03/21/17 08:00 [id number]   





PMRU-M01 03/21/17 10:46 [id number]   














  03/21/17





  08:00


 


PMRU Outcome: DVT Prophylaxis 


 


Outcome/Goals Remains Free of





 DVT





 Complies with





 DVT Prophylaxis





 /Treatment





 Demonstrates





 Knowledge of





 DVT Prevention/





 Treatment


 


Progression Toward Outcome/Goals Progressing








Discharge Planning - Improve/Maintain   Start:  03/17/17 02:06              


Freq:   QSHIFT                          Status: Active      Target:         








Activity Type Activity Date Activity User E-Sign Co-Sign Detail





Recorded Client Recorded Date Recorded By   


 


Document 03/21/17 05:58 [id number]   





PMRU-M10 03/21/17 05:58 [id number]   














  03/21/17





  05:58


 


PMRU Outcome: Discharge Planning 


 


Identify Patient Needs yes


 


Update Patient Family No


 


Outcome/Goals Demonstrates





 Understanding





 of Discharge





 Plan


 


Progression Toward Outcome/Goals Progressing








Mobility- Improve/Maintain              Start:  03/16/17 11:44              


Freq:   QSHIFT                          Status: Active      Target:         








Activity Type Activity Date Activity User E-Sign Co-Sign Detail





Recorded Client Recorded Date Recorded By   


 


Document 03/18/17 19:37 [id number]   





SSU-C14 03/18/17 19:37 [id number]   














  03/18/17





  19:37


 


PMRU Outcome: Mobility 


 


Physical Therapy Evaluation and Yes





Treatment 


 


Activity OOB with Assistance Yes


 


WBAT Yes


 


Device Yes


 


Assistance Yes


 


Patient to be seen 5x/wk for  min/ Therex





day for: Mobility





 Training





 Gait Training





 Balance


 


Other Therapy Comment Hip precautions


 


Outcome/Goals Maintain/





 Achieve





 Baseline





 Mobility Status





 Improve





 Mobility Status





 Demonstrates





 Proper Use of





 Assistive





 Devices





 Free from





 Complications





 of Immobility


 


Progression Toward Outcome/Goals Progressing


 


Bed Mobility Yes:





 Independent


 


Transfers Yes: Modified





 Independent





 with RW


 


Gait x ft Yes: Modified





 Independent 150





 ' with RW


 


Up/Down Stairs Yes:





 Independent 6





 steps 2 rails


 


With HEP Yes:





 Independent


 


Goal Comment Recall 3/3 hip





 precautions





 independently








Pain/Comfort-Improve/Maintain           Start:  03/16/17 22:03              


Freq:   QSHIFT                          Status: Complete    Target:         








Activity Type Activity Date Activity User E-Sign Co-Sign Detail





Recorded Client Recorded Date Recorded By   


 


Document 03/18/17 08:00 [id number]   





PMRU-M10 03/18/17 13:05 [id number]   














  03/18/17





  08:00


 


Outcome: Pain/Comfort 


 


Outcome/Goals Demonstrates





 Knowledge and





 Use of





 Available





 Comfort





 Measures





 Achieves





 Acceptable





 Comfort/Pain





 Level as





 Determined by





 Patient/Condit


 


Outcome/Goals Met Demonstrated





 Knowledge and





 Use of





 Available





 Comfort





 Measures





 Achieved





 Acceptable





 Comfort/Pain





 Level as





 Determined by





 Patient/Condit








Safety- Improve/Maintain                Start:  03/17/17 02:06              


Freq:   QSHIFT                          Status: Active      Target:         








Activity Type Activity Date Activity User E-Sign Co-Sign Detail





Recorded Client Recorded Date Recorded By   


 


Document 03/21/17 08:00 [id number]   





PMRU-M01 03/21/17 10:46 [id number]   














  03/21/17





  08:00


 


PMRU Outcome: Safety 


 


Outcome/Goals Remain Free of





 Injury or Harm





 Cooperates with





 Safety





 Measures for





 Least





 Restrictive





 Environment


 


Progression Toward Outcome/Goals Progressing








Skin- Improve/Maintain                  Start:  03/17/17 02:06              


Freq:   QSHIFT                          Status: Active      Target:         








Activity Type Activity Date Activity User E-Sign Co-Sign Detail





Recorded Client Recorded Date Recorded By   


 


Document 03/21/17 08:00 [id number]   





PMRU-M01 03/21/17 10:46 [id number]   














  03/21/17





  08:00


 


PMRU Outcome: Skin 


 


Skin Risk Level Medium


 


Skin Orders Dressing Change


 


Dressing Change Comments bandages inyact





 over two





 popped blisters





 . moderate





 amount of





 serosanguinous





 dried drainage





 on both.


 


Outcome/Goals Maintain/





 Improve Wound





 Status





 Surgical





 Incisions





 Healing


 


Progression Toward Outcome/Goals Progressing














Medicine Note: 








Length of Stay:  7 days





Anticipated Discharge Destination: Home





Tentative Discharge Date:  03/28/17





Discharged to:  [facility]

## 2017-03-22 RX ADMIN — BIMATOPROST SCH DROP: 0.1 SOLUTION/ DROPS OPHTHALMIC at 17:30

## 2017-03-22 RX ADMIN — ACETAMINOPHEN PRN MG: 325 TABLET ORAL at 22:01

## 2017-03-22 RX ADMIN — SOTALOL HYDROCHLORIDE SCH MG: 80 TABLET ORAL at 08:44

## 2017-03-22 RX ADMIN — DILTIAZEM HYDROCHLORIDE SCH MG: 240 CAPSULE, COATED, EXTENDED RELEASE ORAL at 08:45

## 2017-03-22 RX ADMIN — Medication SCH CAP: at 20:00

## 2017-03-22 RX ADMIN — FUROSEMIDE SCH MG: 20 TABLET ORAL at 08:44

## 2017-03-22 RX ADMIN — Medication SCH CAP: at 08:43

## 2017-03-22 RX ADMIN — SENNOSIDES SCH: 8.6 TABLET, FILM COATED ORAL at 20:01

## 2017-03-22 RX ADMIN — POTASSIUM CHLORIDE SCH MEQ: 750 TABLET, FILM COATED, EXTENDED RELEASE ORAL at 08:44

## 2017-03-22 RX ADMIN — SOTALOL HYDROCHLORIDE SCH MG: 80 TABLET ORAL at 17:29

## 2017-03-22 RX ADMIN — DOCUSATE SODIUM SCH MG: 100 CAPSULE, LIQUID FILLED ORAL at 19:59

## 2017-03-22 RX ADMIN — RIVAROXABAN SCH MG: 15 TABLET, FILM COATED ORAL at 08:44

## 2017-03-22 RX ADMIN — DOCUSATE SODIUM SCH: 100 CAPSULE, LIQUID FILLED ORAL at 08:44

## 2017-03-23 LAB
HCT VFR BLD AUTO: 28 % (ref 42–52)
HGB BLD-MCNC: 9.7 G/DL (ref 14–18)
MCH RBC QN AUTO: 34 PG (ref 27–31)
MCHC RBC AUTO-ENTMCNC: 34 G/DL (ref 31–36)
MCV RBC AUTO: 99 FL (ref 80–94)
RBC # BLD AUTO: 2.87 10^6/UL (ref 4–5.4)
WBC # BLD AUTO: 9.1 10^3/UL (ref 3.5–10.8)

## 2017-03-23 RX ADMIN — DOCUSATE SODIUM SCH MG: 100 CAPSULE, LIQUID FILLED ORAL at 19:57

## 2017-03-23 RX ADMIN — SENNOSIDES SCH: 8.6 TABLET, FILM COATED ORAL at 19:45

## 2017-03-23 RX ADMIN — SOTALOL HYDROCHLORIDE SCH MG: 80 TABLET ORAL at 08:34

## 2017-03-23 RX ADMIN — POTASSIUM CHLORIDE SCH MEQ: 750 TABLET, FILM COATED, EXTENDED RELEASE ORAL at 08:34

## 2017-03-23 RX ADMIN — Medication SCH CAP: at 08:33

## 2017-03-23 RX ADMIN — DILTIAZEM HYDROCHLORIDE SCH MG: 240 CAPSULE, COATED, EXTENDED RELEASE ORAL at 08:34

## 2017-03-23 RX ADMIN — Medication SCH CAP: at 19:57

## 2017-03-23 RX ADMIN — BIMATOPROST SCH DROP: 0.1 SOLUTION/ DROPS OPHTHALMIC at 17:27

## 2017-03-23 RX ADMIN — SOTALOL HYDROCHLORIDE SCH MG: 80 TABLET ORAL at 17:26

## 2017-03-23 RX ADMIN — FUROSEMIDE SCH MG: 20 TABLET ORAL at 08:34

## 2017-03-23 RX ADMIN — RIVAROXABAN SCH MG: 15 TABLET, FILM COATED ORAL at 08:34

## 2017-03-23 RX ADMIN — DOCUSATE SODIUM SCH: 100 CAPSULE, LIQUID FILLED ORAL at 08:34

## 2017-03-24 LAB
ALBUMIN SERPL BCG-MCNC: 2.9 G/DL (ref 3.2–5.2)
ALP SERPL-CCNC: 160 U/L (ref 34–104)
ALT SERPL W P-5'-P-CCNC: 56 U/L (ref 7–52)
ANION GAP SERPL CALC-SCNC: 4 MMOL/L (ref 2–11)
AST SERPL-CCNC: 41 U/L (ref 13–39)
BUN SERPL-MCNC: 16 MG/DL (ref 6–24)
BUN/CREAT SERPL: 22.9 (ref 8–20)
CALCIUM SERPL-MCNC: 8.6 MG/DL (ref 8.6–10.3)
CHLORIDE SERPL-SCNC: 95 MMOL/L (ref 101–111)
GLOBULIN SER CALC-MCNC: 2.8 G/DL (ref 2–4)
GLUCOSE SERPL-MCNC: 95 MG/DL (ref 70–100)
HCO3 SERPL-SCNC: 29 MMOL/L (ref 22–32)
HCT VFR BLD AUTO: 28 % (ref 42–52)
HGB BLD-MCNC: 9.5 G/DL (ref 14–18)
MCH RBC QN AUTO: 33 PG (ref 27–31)
MCHC RBC AUTO-ENTMCNC: 34 G/DL (ref 31–36)
MCV RBC AUTO: 99 FL (ref 80–94)
POTASSIUM SERPL-SCNC: 4.2 MMOL/L (ref 3.5–5)
PROT SERPL-MCNC: 5.7 G/DL (ref 6.4–8.9)
RBC # BLD AUTO: 2.84 10^6/UL (ref 4–5.4)
SODIUM SERPL-SCNC: 128 MMOL/L (ref 133–145)
WBC # BLD AUTO: 9.7 10^3/UL (ref 3.5–10.8)

## 2017-03-24 RX ADMIN — DOCUSATE SODIUM SCH MG: 100 CAPSULE, LIQUID FILLED ORAL at 20:14

## 2017-03-24 RX ADMIN — SOTALOL HYDROCHLORIDE SCH MG: 80 TABLET ORAL at 17:22

## 2017-03-24 RX ADMIN — ACETAMINOPHEN PRN MG: 325 TABLET ORAL at 21:48

## 2017-03-24 RX ADMIN — DOCUSATE SODIUM SCH MG: 100 CAPSULE, LIQUID FILLED ORAL at 09:06

## 2017-03-24 RX ADMIN — BIMATOPROST SCH DROP: 0.1 SOLUTION/ DROPS OPHTHALMIC at 17:23

## 2017-03-24 RX ADMIN — SOTALOL HYDROCHLORIDE SCH MG: 80 TABLET ORAL at 09:06

## 2017-03-24 RX ADMIN — DILTIAZEM HYDROCHLORIDE SCH MG: 240 CAPSULE, COATED, EXTENDED RELEASE ORAL at 09:06

## 2017-03-24 RX ADMIN — POTASSIUM CHLORIDE SCH MEQ: 750 TABLET, FILM COATED, EXTENDED RELEASE ORAL at 09:07

## 2017-03-24 RX ADMIN — Medication SCH CAP: at 09:07

## 2017-03-24 RX ADMIN — RIVAROXABAN SCH MG: 15 TABLET, FILM COATED ORAL at 09:07

## 2017-03-24 RX ADMIN — FUROSEMIDE SCH MG: 20 TABLET ORAL at 09:07

## 2017-03-24 RX ADMIN — SENNOSIDES SCH TAB: 8.6 TABLET, FILM COATED ORAL at 20:14

## 2017-03-24 RX ADMIN — Medication SCH CAP: at 20:15

## 2017-03-25 RX ADMIN — SOTALOL HYDROCHLORIDE SCH MG: 80 TABLET ORAL at 10:21

## 2017-03-25 RX ADMIN — Medication SCH CAP: at 19:58

## 2017-03-25 RX ADMIN — FUROSEMIDE SCH MG: 20 TABLET ORAL at 10:21

## 2017-03-25 RX ADMIN — SENNOSIDES SCH: 8.6 TABLET, FILM COATED ORAL at 20:00

## 2017-03-25 RX ADMIN — ACETAMINOPHEN PRN MG: 325 TABLET ORAL at 21:57

## 2017-03-25 RX ADMIN — Medication SCH CAP: at 10:20

## 2017-03-25 RX ADMIN — RIVAROXABAN SCH MG: 15 TABLET, FILM COATED ORAL at 10:21

## 2017-03-25 RX ADMIN — SOTALOL HYDROCHLORIDE SCH MG: 80 TABLET ORAL at 17:35

## 2017-03-25 RX ADMIN — DILTIAZEM HYDROCHLORIDE SCH MG: 240 CAPSULE, COATED, EXTENDED RELEASE ORAL at 10:21

## 2017-03-25 RX ADMIN — DOCUSATE SODIUM SCH MG: 100 CAPSULE, LIQUID FILLED ORAL at 19:57

## 2017-03-25 RX ADMIN — DOCUSATE SODIUM SCH: 100 CAPSULE, LIQUID FILLED ORAL at 10:24

## 2017-03-25 RX ADMIN — BIMATOPROST SCH DROP: 0.1 SOLUTION/ DROPS OPHTHALMIC at 17:36

## 2017-03-25 RX ADMIN — POTASSIUM CHLORIDE SCH MEQ: 750 TABLET, FILM COATED, EXTENDED RELEASE ORAL at 10:21

## 2017-03-26 RX ADMIN — FUROSEMIDE SCH MG: 20 TABLET ORAL at 08:45

## 2017-03-26 RX ADMIN — DILTIAZEM HYDROCHLORIDE SCH MG: 240 CAPSULE, COATED, EXTENDED RELEASE ORAL at 08:45

## 2017-03-26 RX ADMIN — POTASSIUM CHLORIDE SCH MEQ: 750 TABLET, FILM COATED, EXTENDED RELEASE ORAL at 08:45

## 2017-03-26 RX ADMIN — ACETAMINOPHEN PRN MG: 325 TABLET ORAL at 21:59

## 2017-03-26 RX ADMIN — Medication SCH CAP: at 21:10

## 2017-03-26 RX ADMIN — DOCUSATE SODIUM SCH MG: 100 CAPSULE, LIQUID FILLED ORAL at 21:10

## 2017-03-26 RX ADMIN — SENNOSIDES SCH TAB: 8.6 TABLET, FILM COATED ORAL at 21:09

## 2017-03-26 RX ADMIN — SOTALOL HYDROCHLORIDE SCH MG: 80 TABLET ORAL at 18:15

## 2017-03-26 RX ADMIN — RIVAROXABAN SCH MG: 15 TABLET, FILM COATED ORAL at 08:45

## 2017-03-26 RX ADMIN — SOTALOL HYDROCHLORIDE SCH MG: 80 TABLET ORAL at 08:45

## 2017-03-26 RX ADMIN — BIMATOPROST SCH DROP: 0.1 SOLUTION/ DROPS OPHTHALMIC at 18:15

## 2017-03-26 RX ADMIN — SENNOSIDES SCH: 8.6 TABLET, FILM COATED ORAL at 21:15

## 2017-03-26 RX ADMIN — Medication SCH CAP: at 08:45

## 2017-03-26 RX ADMIN — DOCUSATE SODIUM SCH: 100 CAPSULE, LIQUID FILLED ORAL at 08:46

## 2017-03-27 RX ADMIN — SOTALOL HYDROCHLORIDE SCH MG: 80 TABLET ORAL at 17:42

## 2017-03-27 RX ADMIN — DILTIAZEM HYDROCHLORIDE SCH MG: 240 CAPSULE, COATED, EXTENDED RELEASE ORAL at 07:51

## 2017-03-27 RX ADMIN — Medication SCH CAP: at 07:51

## 2017-03-27 RX ADMIN — ACETAMINOPHEN PRN MG: 325 TABLET ORAL at 21:59

## 2017-03-27 RX ADMIN — SOTALOL HYDROCHLORIDE SCH MG: 80 TABLET ORAL at 07:51

## 2017-03-27 RX ADMIN — DOCUSATE SODIUM SCH MG: 100 CAPSULE, LIQUID FILLED ORAL at 07:51

## 2017-03-27 RX ADMIN — SENNOSIDES SCH: 8.6 TABLET, FILM COATED ORAL at 19:58

## 2017-03-27 RX ADMIN — BIMATOPROST SCH DROP: 0.1 SOLUTION/ DROPS OPHTHALMIC at 17:43

## 2017-03-27 RX ADMIN — Medication SCH CAP: at 19:57

## 2017-03-27 RX ADMIN — RIVAROXABAN SCH MG: 15 TABLET, FILM COATED ORAL at 07:51

## 2017-03-27 RX ADMIN — FUROSEMIDE SCH MG: 20 TABLET ORAL at 07:51

## 2017-03-27 RX ADMIN — DOCUSATE SODIUM SCH MG: 100 CAPSULE, LIQUID FILLED ORAL at 19:58

## 2017-03-27 RX ADMIN — POTASSIUM CHLORIDE SCH MEQ: 750 TABLET, FILM COATED, EXTENDED RELEASE ORAL at 07:51

## 2017-03-28 VITALS — SYSTOLIC BLOOD PRESSURE: 118 MMHG | DIASTOLIC BLOOD PRESSURE: 54 MMHG

## 2017-03-28 RX ADMIN — Medication SCH CAP: at 08:46

## 2017-03-28 RX ADMIN — FUROSEMIDE SCH MG: 20 TABLET ORAL at 08:47

## 2017-03-28 RX ADMIN — RIVAROXABAN SCH MG: 15 TABLET, FILM COATED ORAL at 08:47

## 2017-03-28 RX ADMIN — DOCUSATE SODIUM SCH: 100 CAPSULE, LIQUID FILLED ORAL at 08:48

## 2017-03-28 RX ADMIN — POTASSIUM CHLORIDE SCH MEQ: 750 TABLET, FILM COATED, EXTENDED RELEASE ORAL at 08:47

## 2017-03-28 RX ADMIN — DILTIAZEM HYDROCHLORIDE SCH MG: 240 CAPSULE, COATED, EXTENDED RELEASE ORAL at 08:47

## 2017-03-28 RX ADMIN — SOTALOL HYDROCHLORIDE SCH MG: 80 TABLET ORAL at 08:46

## 2017-03-30 NOTE — DS
DISCHARGE SUMMARY:

 

DATE OF ADMISSION:  03/16/17

 

DATE OF DISCHARGE:  03/28/17

 

DISCHARGE DIAGNOSES:

1.  Right hip fracture.

2.  Paroxysmal atrial fibrillation.

3.  Hypertension.

4.  Status post aortic valve replacement.

 

HISTORY OF ILLNESS AND HOSPITAL COURSE:  For complete history of the events 
leading up to his rehab stay, please see the history and physical as dictated 
by me on 03/16/17.  While on the rehab unit, his Xarelto was restarted.  The 
patient's wound healed well.  His staples were able to be removed without 
difficulty.  The patient avoided any pain medications as he felt they made him 
confused.  He had some difficulties with mild constipation, but this responded 
to a stool softener. Otherwise, the patient was medically stable.  



The patient was seen by both Physical and Occupational Therapy and made good 
gains with both disciplines.  With physical therapy at the time of admission, 
the patient required min to mod assist of 2 people in order to do a transfer.  
He could ambulate about 12 feet with contact guard.  With occupational therapy, 
he was mod assist with toileting and toilet transfers as well as lower body 
dressing.  By the time of discharge, the patient was independent in transfers, 
independent ambulating 150 feet using a two-wheeled walker, independent going 
up and down few stairs.  He was modified independent with dressing and toileting
, modified independent with toilet transfers and tub transfers.  The patient 
was discharged home on 03/28/17.

 

DISCHARGE DIET:  Regular.

 

DISCHARGE MEDICATIONS:

1.  Lumigan eyedrops 1 drop both eyes every evening.

2.  Cardizem  mg daily.

3.  Lasix 20 mg daily.

4.  Potassium 10 mEq daily.

5.  Xarelto 15 mg daily.

6.  Betapace 80 mg twice daily. SERVICES AFTER DISCHARGE:  Through visiting 
nurse service.  He will have home nursing.

 

FOLLOWUP:  Followup will be with Dr. Vadim Schuster as well as with Dr. Jose Roberto Stone.



CC:  Dr. Joes Roberto Stone*

 

 [id number]/CPS #: [id number]

MTDD

## 2018-07-20 ENCOUNTER — HOSPITAL ENCOUNTER (OUTPATIENT)
Dept: HOSPITAL 25 - ED | Age: 83
Setting detail: OBSERVATION
LOS: 1 days | Discharge: HOME | End: 2018-07-21
Attending: HOSPITALIST | Admitting: HOSPITALIST
Payer: MEDICARE

## 2018-07-20 DIAGNOSIS — I10: ICD-10-CM

## 2018-07-20 DIAGNOSIS — Z96.651: ICD-10-CM

## 2018-07-20 DIAGNOSIS — Z79.01: ICD-10-CM

## 2018-07-20 DIAGNOSIS — Z95.2: ICD-10-CM

## 2018-07-20 DIAGNOSIS — M19.91: ICD-10-CM

## 2018-07-20 DIAGNOSIS — K92.2: Primary | ICD-10-CM

## 2018-07-20 DIAGNOSIS — K26.9: ICD-10-CM

## 2018-07-20 DIAGNOSIS — I48.91: ICD-10-CM

## 2018-07-20 DIAGNOSIS — I35.0: ICD-10-CM

## 2018-07-20 LAB
BASOPHILS # BLD AUTO: 0.1 10^3/UL (ref 0–0.2)
EOSINOPHIL # BLD AUTO: 0.1 10^3/UL (ref 0–0.6)
HCT VFR BLD AUTO: 28 % (ref 42–52)
HCT VFR BLD AUTO: 31 % (ref 42–52)
HGB BLD-MCNC: 10.8 G/DL (ref 14–18)
HGB BLD-MCNC: 9.6 G/DL (ref 14–18)
INR PPP/BLD: 1.08 (ref 0.77–1.02)
LYMPHOCYTES # BLD AUTO: 1 10^3/UL (ref 1–4.8)
MCH RBC QN AUTO: 36 PG (ref 27–31)
MCHC RBC AUTO-ENTMCNC: 34 G/DL (ref 31–36)
MCV RBC AUTO: 104 FL (ref 80–94)
MONOCYTES # BLD AUTO: 0.8 10^3/UL (ref 0–0.8)
NEUTROPHILS # BLD AUTO: 6.6 10^3/UL (ref 1.5–7.7)
NRBC # BLD AUTO: 0 10^3/UL
NRBC BLD QL AUTO: 0
PLATELET # BLD AUTO: 143 10^3/UL (ref 150–450)
RBC # BLD AUTO: 3.02 10^6/UL (ref 4–5.4)
WBC # BLD AUTO: 8.5 10^3/UL (ref 3.5–10.8)

## 2018-07-20 PROCEDURE — 80053 COMPREHEN METABOLIC PANEL: CPT

## 2018-07-20 PROCEDURE — 85025 COMPLETE CBC W/AUTO DIFF WBC: CPT

## 2018-07-20 PROCEDURE — 82272 OCCULT BLD FECES 1-3 TESTS: CPT

## 2018-07-20 PROCEDURE — 85730 THROMBOPLASTIN TIME PARTIAL: CPT

## 2018-07-20 PROCEDURE — 80048 BASIC METABOLIC PNL TOTAL CA: CPT

## 2018-07-20 PROCEDURE — 83550 IRON BINDING TEST: CPT

## 2018-07-20 PROCEDURE — 93005 ELECTROCARDIOGRAM TRACING: CPT

## 2018-07-20 PROCEDURE — 86140 C-REACTIVE PROTEIN: CPT

## 2018-07-20 PROCEDURE — 99284 EMERGENCY DEPT VISIT MOD MDM: CPT

## 2018-07-20 PROCEDURE — 85014 HEMATOCRIT: CPT

## 2018-07-20 PROCEDURE — 96365 THER/PROPH/DIAG IV INF INIT: CPT

## 2018-07-20 PROCEDURE — 85027 COMPLETE CBC AUTOMATED: CPT

## 2018-07-20 PROCEDURE — 87077 CULTURE AEROBIC IDENTIFY: CPT

## 2018-07-20 PROCEDURE — G0378 HOSPITAL OBSERVATION PER HR: HCPCS

## 2018-07-20 PROCEDURE — 36415 COLL VENOUS BLD VENIPUNCTURE: CPT

## 2018-07-20 PROCEDURE — 99157 MOD SED OTHER PHYS/QHP EA: CPT

## 2018-07-20 PROCEDURE — 99156 MOD SED OTH PHYS/QHP 5/>YRS: CPT

## 2018-07-20 PROCEDURE — 85610 PROTHROMBIN TIME: CPT

## 2018-07-20 PROCEDURE — 83540 ASSAY OF IRON: CPT

## 2018-07-20 PROCEDURE — 82728 ASSAY OF FERRITIN: CPT

## 2018-07-20 PROCEDURE — 85018 HEMOGLOBIN: CPT

## 2018-07-20 RX ADMIN — PANTOPRAZOLE SODIUM SCH MLS/HR: 40 INJECTION, POWDER, FOR SOLUTION INTRAVENOUS at 21:29

## 2018-07-20 NOTE — HP
CC:  Dr. King *

 

HISTORY AND PHYSICAL:

 

DATE OF ADMISSION:  07/20/18

 

PRIMARY CARE PROVIDER:  Dr. King.

 

CHIEF COMPLAINT:  Black stools.

 

HISTORY OF PRESENT ILLNESS:  Mr. Don is an 89-year-old male with history of 
AFib, on Xarelto, who saw Dr. King on the day of admission with complaints of 
peanut butter textured stool that was very dark.  She performed a stool guaiac 
in her office that was positive.  The patient was referred to the emergency 
room for evaluation and probable admission.  The patient states that the stools 
have been abnormal for approximately one week.  He states that over the last 
couple of days, he has noted some increased fatigue.  He denies any 
lightheadedness or dizziness. He denies any chest pain or shortness of breath.  
He denies any use of aspirin or NSAIDs, but he does take prednisone 
chronically.  Additionally, he is on the Xarelto again for atrial fibrillation.

 

PAST MEDICAL HISTORY:

1.  Atrial fibrillation.

2.  Aortic stenosis, status post bioprosthetic aortic valve replacement.

3.  OA.

4.  Hypertension.

 

PAST SURGICAL HISTORY:

1.  Right total hip replacement, status post mechanical fall in 2017.

2.  Kidney surgery in the distant past.

 

MEDICATIONS:

1.  PreserVision AREDS 2 one cap p.o. b.i.d.

2.  Potassium chloride 10 mEq p.o. daily.

3.  Hydroxychloroquine 400 mg p.o. daily.

4.  Doxycycline 100 mg p.o. daily p.r.n. dental appointments.

5.  Prednisone 5 mg p.o. daily.

6.  Sotalol 40 mg p.o. twice daily.

7.  Lasix 20 mg p.o. daily.

8.  Xalatan one drop to both eyes q.h.s.

9.  Diltiazem  mg p.o. daily.

10.  Xarelto 15 mg p.o. daily.

 

ALLERGIES:  TETRACYCLINE.

 

FAMILY HISTORY:  There is no family history of coronary artery disease, 
diabetes or cancer within his parents.  The patient has two daughters, both of 
which have diabetes.

 

SOCIAL HISTORY:  The patient is a nonsmoker.  He drinks alcohol on occasion.  
He is a [employer] .  He is .  He has four children.  He indicates 
that his son Jose is his healthcare proxy.

 

REVIEW OF SYSTEMS:  A complete 11-system review of systems is obtained.  
Pertinent positives and negatives are as per HPI and otherwise negative.

 

                               PHYSICAL EXAMINATION

 

GENERAL:  The patient is a well-developed, elderly but relatively robust-
appearing male sitting in stretcher, no acute distress.

 

VITAL SIGNS:  Blood pressure 159/82, pulse 81, respirations 16, temperature 97.8
, O2 sat 97% on room air.

 

HEENT:  Pupils are equal and round.  Extraocular muscles are intact.  
Oropharynx is clear.  Oral mucosa is moist.  There is no submandibular, 
cervical or supraclavicular adenopathy.  Thyroid is not enlarged.  No thyroid 
nodules are noted.

 

PULMONARY:  Lungs are clear to auscultation bilaterally.

 

CARDIAC:  Normal S1, S2.  Regular rate and rhythm.  I did not appreciate any 
murmurs.  There is 1+ bilateral lower extremity pitting edema.

 

ABDOMEN:  Bowel sounds present.  Abdomen is soft, nontender, nondistended.

 

MUSCULOSKELETAL:  There is no cyanosis or clubbing of the digits.  There is 
full active range of motion of all four extremities.

 

SKIN:  Warm and dry. There are no rashes.

 

NEUROLOGIC:  Cranial nerves II through XII are grossly intact.  Sensation is 
intact to light touch throughout.  Strength is 5/5 and symmetric in both upper 
and lower extremities bilaterally.

 

PSYCH:  The patient is alert and oriented x3.  Affect appears appropriate.

 

 LABORATORY DATA:  WBC 8.5, hemoglobin 10.8, hematocrit 31, platelets 143, INR 
1.08. Sodium 130, potassium 4.4, chloride 94, CO2 of 28, BUN 18, creatinine 0.93
, glucose 110, calcium 9.1, bilirubin 0.9, AST 26, ALT 16, alk-phos 43, CRP 2.38
, albumin 3.8.

 

EKG reveals normal sinus rhythm without any acute ST-T wave abnormalities.

 

ASSESSMENT AND PLAN:  Mr. Don is an 89-year-old male with history of atrial 
fibrillation, on Xarelto as well as arthritis, on chronic prednisone, who 
presents to the emergency room with complaints of very black stools from his 
primary care provider's office where he tested positive for blood within his 
stool.

 

1.  Possible upper gastrointestinal bleed:  The patient's hemoglobin is 
significantly reduced from his last check in May 2018, where his hemoglobin was 
15.8.  Currently, it is 10.8.  This seems like this would be an upper GI bleed 
given the very dark stool; however, his BUN is not elevated.  At this point, 
the patient received Protonix 80 mg IV push x1 followed by a Protonix drip.  I 
have spoken with Dr. Sherman, who will see the patient in consultation from GI.  
The patient for now will be on clear liquids for possible EGD tomorrow.  The 
patient's Xarelto will obviously be held at this point.  I will continue his 
prednisone for now, however.

2.  Atrial fibrillation:  The patient's heart rate is controlled at this point.
  He will continue on his sotalol and diltiazem.  His Xarelto again is going to 
be held.

3.  Hypertension:  The patient's blood pressure is under decent control on his 
usual dose of Xarelto.  This will be monitored.

4.  DVT prophylaxis:  According to the Adult Thrombosis Prophylaxis Risk Factor 
Assessment Guide, the patient has a total risk factor score of 5 making him 
high risk.  SCDs will be utilized as DVT prophylaxis as the patient may have a 
GI bleed.

5.  Code status:  Full.

 

TIME SPENT:  Sixty five minutes were spent admitting this patient, of which 
greater than half of it was spent face-to-face with the patient reviewing his 
history and performing a physical exam.

 

 

 

[id number]/[facility] #: [id number]

MTDD

## 2018-07-20 NOTE — ED
GI/ HPI





- HPI Summary


HPI Summary: 





Patient sent to the ED by her primary care for heme positive stool.  Patient 

states he's been having dark tarry stools 1 week, with some notable decrease 

in energy in the past 2 days.  Denies any other symptoms or pain.  Denies trauma

, fever, cough, sore throat, CP, SOB, N/V/D, abdominal pain, change in urinary 

BM.  No prior history of GI bleed.  Also states he was orthostatic positive at 

primary care.  Last colonoscopy 2011.  Patient on Xarelto.  Medical history is 

CHF, HTN, aortic valve replacement, A. fib, arthritis.  Blood type A positive





- History of Current Complaint


Chief Complaint: EDGIBleed


Time Seen by Provider: 07/20/18 18:11


Stated Complaint: RECTAL BLEEDING


Hx Obtained From: Patient, Family/Caretaker


Onset/Duration: Started Days Ago


Timing: Constant


Current Severity: None


Vaginal Bleeding Description: Brownish-Red


Pain Intensity: 0


Associated Signs and Symptoms: Positive: Black Tarry Stool


Aggravating Factor(s): Nothing


Alleviating Factor(s): Nothing





- Additional Pertinent History


Primary Care Physician: [id number]





- Allergy/Home Medications


Allergies/Adverse Reactions: 


 Allergies











Allergy/AdvReac Type Severity Reaction Status Date / Time


 


tetracycline Allergy  Unknown Verified 07/20/18 15:23





   Reaction  





   Details  











Home Medications: 


 Home Medications





DOXYcycline CAP(*) [DOXYcycline 100MG CAP(*)] 100 mg PO DAILY PRN 07/20/18 [

History Confirmed 07/20/18]


Furosemide TAB* [Lasix TAB*] 20 mg PO DAILY 07/20/18 [History Confirmed 07/20/18

]


Hydroxychloroquine TAB* [Plaquenil TAB*] 400 mg PO DAILY 07/20/18 [History 

Confirmed 07/20/18]


Latanoprost 0.005%* [Xalatan 0.005%*] 1 drop BOTH EYES QPM 07/20/18 [History 

Confirmed 07/20/18]


Potassium Chlor TAB (NF) [Kaon-Cl-10 TAB (NF)] 10 meq PO DAILY 07/20/18 [

History Confirmed 07/20/18]


Rivaroxaban TAB(*) [Xarelto 15 mg(*)] 15 mg PO DAILY 07/20/18 [History 

Confirmed 07/20/18]


Sotalol TAB* [Betapace 80 MG TAB*] 40 mg PO 0900,1700 07/20/18 [History 

Confirmed 07/20/18]


Vit C/E/Zn/Coppr/Lutein/Zeaxan [Preservision Areds 2 Softgel] 1 cap PO BID 07/20 /18 [History Confirmed 07/20/18]


predniSONE TAB* [Deltasone TAB*] 5 mg PO DAILY 07/20/18 [History Confirmed 07/20 /18]











PMH/Surg Hx/FS Hx/Imm Hx


Endocrine/Hematology History: Reports: Hx Anticoagulant Therapy, Other Endocrine

/Hematological Disorders - Pt reports Hx of Rheumatoid arthritis for two years, 

then dismissed


Cardiovascular History: Reports: Hx Atrial Fibrillation, Hx Congestive Heart 

Failure, Hx Hypertension


Respiratory History: Reports: Hx Pneumonia - 5 years ago


   Denies: Hx Asthma, Hx Chronic Obstructive Pulmonary Disease (COPD)


GI History: Reports: Hx Gastroesophageal Reflux Disease


 History: Reports: Other  Problems/Disorders - BPH


Musculoskeletal History: Reports: Hx Arthritis, Hx Orthopedic Injury - R ankle


Sensory History: Reports: Hx Contacts or Glasses, Hx Glaucoma


   Denies: Hx Hearing Aid


Opthamlomology History: Reports: Hx Contacts or Glasses, Hx Glaucoma





- Surgical History


Surgery Procedure, Year, and Place: "Kidney lesions removed when I was very 

young", bilateral carpal tunnel, arthroscopy, tonsillectomy, dental surgeries


Hx Anesthesia Reactions: No


Infectious Disease History: No


Infectious Disease History: 


   Denies: Hx of Known/Suspected MRSA, Traveled Outside the [place] in Last 30 Days





- Family History


Known Family History: Positive: None


Family History: pt denies significant FHx





- Social History


Alcohol Use: None


Substance Use Type: Reports: None


Smoking Status (MU): Never Smoked Tobacco





Review of Systems


Constitutional: Negative


Eyes: Negative


ENT: Negative


Cardiovascular: Negative


Respiratory: Negative


Gastrointestinal: Other


Genitourinary: Negative


Musculoskeletal: Negative


Skin: Negative


Neurological: Negative


Psychological: Normal


All Other Systems Reviewed And Are Negative: Yes





Physical Exam


Triage Information Reviewed: Yes


Vital Signs On Initial Exam: 


 Initial Vitals











Temp Pulse Resp BP Pulse Ox


 


 97.8 F   85   16   145/75   100 


 


 07/20/18 15:23  07/20/18 15:23  07/20/18 15:23  07/20/18 15:23  07/20/18 15:23











Vital Signs Reviewed: Yes


Appearance: Positive: Well-Appearing


Skin: Positive: Warm


Head/Face: Positive: Normal Head/Face Inspection


Eyes: Positive: Normal


Neck: Positive: Supple


Respiratory/Lung Sounds: Positive: Clear to Auscultation


Cardiovascular: Positive: Normal


Abdomen Description: Positive: Nontender


Musculoskeletal: Positive: Normal


Neurological: Positive: Normal


Psychiatric: Positive: Normal


AVPU Assessment: Alert





- Chaffee Coma Scale


Best Eye Response: 4 - Spontaneous


Best Motor Response: 6 - Obeys Commands


Best Verbal Response: 5 - Oriented


Coma Scale Total: 15





Diagnostics





- Vital Signs


 Vital Signs











  Temp Pulse Resp BP Pulse Ox


 


 07/20/18 17:40  98.1 F  82   146/70  99


 


 07/20/18 15:23  97.8 F  85  16  145/75  100














- Laboratory


Result Diagrams: 


 07/20/18 18:41





 07/20/18 18:41


Lab Statement: Any lab studies that have been ordered have been reviewed, and 

results considered in the medical decision making process.





- EKG


  ** 1


Cardiac Rate: NL


EKG Rhythm: Sinus Rhythm


ST Segment: Non-Specific


Ectopy: None





GIGU Course/Dx





- Course


Course Of Treatment: Patient sent to the ED by her primary care for heme 

positive stool.  Patient states he's been having dark tarry stools 1 week, 

with some notable decrease in energy in the past 2 days.  Denies any other 

symptoms or pain.  Denies trauma, fever, cough, sore throat, CP, SOB, N/V/D, 

abdominal pain, change in urinary BM.  No prior history of GI bleed.  Also 

states he was orthostatic positive at primary care.  Last colonoscopy 2011.  

Patient on Xarelto.  Medical history is CHF, HTN, aortic valve replacement, A. 

fib, arthritis.  Blood type A positive.  Admission arranged through PCP.  Vital 

signs stable and within normal limits.  Hemoglobin 10.8.  Stool was heme 

positive at primary care.  Visually positive for blood.  Hemoccult negative 

here in the ED.





- Diagnoses


Provider Diagnoses: 


 GI bleed








Discharge





- Sign-Out/Discharge


Documenting (check all that apply): Patient Departure





- Discharge Plan


Condition: Stable


Disposition: ADMITTED TO [facility]





- Billing Disposition and Condition


Condition: STABLE


Disposition: Admitted to [facility]

## 2018-07-21 VITALS — DIASTOLIC BLOOD PRESSURE: 57 MMHG | SYSTOLIC BLOOD PRESSURE: 104 MMHG

## 2018-07-21 LAB
HCT VFR BLD AUTO: 28 % (ref 42–52)
HGB BLD-MCNC: 9.9 G/DL (ref 14–18)
MCH RBC QN AUTO: 37 PG (ref 27–31)
MCHC RBC AUTO-ENTMCNC: 36 G/DL (ref 31–36)
MCV RBC AUTO: 104 FL (ref 80–94)
PLATELET # BLD AUTO: 104 10^3/UL (ref 150–450)
RBC # BLD AUTO: 2.69 10^6/UL (ref 4–5.4)
WBC # BLD AUTO: 5.9 10^3/UL (ref 3.5–10.8)

## 2018-07-21 RX ADMIN — PANTOPRAZOLE SODIUM SCH MLS/HR: 40 INJECTION, POWDER, FOR SOLUTION INTRAVENOUS at 08:23

## 2018-07-21 NOTE — CONS
CC:  Dr. King; Dr. Porter; Dr. Eula Sherman

 

GASTROENTEROLOGY CONSULTATION:

 

DATE OF CONSULT:  07/21/18

 

PRIMARY CARE PROVIDER:  Dr. King.

 

HOSPITAL PROVIDER:  Dr. Porter.

 

REASON FOR CONSULTATION:  Melena.

 

HISTORY OF PRESENT ILLNESS:  Mr. Don is a very pleasant 89-year-old 
gentleman with a history of atrial fibrillation, on Xarelto and aortic stenosis
, status post bioprosthetic aortic valve replacement and hypertension along 
with osteoarthritis, on prednisone therapy, who presented with complaints of 
peanut butter textured stool that was very dark in nature.  He was noted to be 
Hemoccult positive in his primary care doctor's office and was referred to the 
emergency room for possible admission. He has been noticing this textured stool 
for approximately 1 week.  Over the last several days, he has also noticed some 
increased fatigue, but he denies any chest pain, shortness of breath.  He 
denies NSAID use; however, he is on chronic prednisone for his arthritic pain 
as well as Xarelto for his atrial fibrillation. He has been on higher doses of 
prednisone long-term previously, but recently was decreased to 5 mg daily.  He 
currently denies any abdominal pain, hematemesis, nausea, vomiting.  He denies 
bright red blood per rectum.  He does believe he may have had an upper 
endoscopy many years ago but does not remember the results.  His last 
colonoscopy was less than 10 years ago and states was normal at that time.  
Gastroenterology was consulted for further evaluation and possible endoscopy.

 

PAST MEDICAL HISTORY:

1.  Atrial fibrillation, on Xarelto.

2.  Aortic stenosis, status post bioprosthetic aortic valve replacement.

3.  Osteoarthritis on long-term prednisone.

4.  Hypertension.

 

PAST SURGICAL HISTORY:

1.  Right total knee replacement, status post mechanical fall in 2017.

2.  Kidney surgery in the distant past.

3.  Colonoscopy less than 10 years ago. 



HOME MEDICATIONS:

1.  PreserVision AREDS.

2.  Potassium chloride.

3.  Hydroxychloroquine.

4.  Doxycycline.

5.  Prednisone.

6.  Sotalol.

7.  Lasix.

8.  Xalatan drops.

9.  Diltiazem.

10.  Xarelto.

 

ALLERGIES:  To TETRACYCLINE.

 

FAMILY HISTORY:  No history of gastrointestinal malignancies that the patient 
can recall.

 

SOCIAL HISTORY:  He denies any tobacco use.  Admits to occasional alcohol use.  
No illicit drug use.  He is a [employer] .

 

REVIEW OF SYSTEMS:  Review of systems on a 14-point scale has been reviewed.  
All pertinent positives and negatives have been noted above in the HPI.

 

PHYSICAL EXAM:  Vital Signs:  Temperature 98.6, pulse 76, respirations 16, 
blood pressure 136/70.  Generally, the patient is alert and oriented x3, in no 
acute distress, well nourished.  HEENT:  Normocephalic, atraumatic.  
Extraocular muscles intact.  Anicteric sclerae bilaterally.  Cardiovascular Exam
:  Regular rate and rhythm.  Pulmonary Exam:  Clear to auscultation 
bilaterally.  Abdomen:  Positive bowel sounds.  Soft, nontender, nondistended.  
No rebound, guarding, or rigidity. Extremities:  No clubbing, cyanosis, or 
edema.  Neurological Exam:  No gross focal deficits are appreciated.

 

DIAGNOSTIC STUDIES/LAB DATA:  WBC is 5.9, hemoglobin 9.9, hematocrit 28, 
platelets 104.  INR 1.08.  Sodium 134, potassium 3.8, chloride 100, CO2 of 28, 
anion gap 6, BUN 14, creatinine 0.77, calcium 8.3.  Iron 47, TIBC 263, percent 
saturation 18, transferrin 188, ferritin 52.1.  Total bilirubin 0.90, AST 26, 
ALT 16, alkaline phosphatase 43.  CRP 2.3.  Total protein 6.2, albumin 3.8.

 

ASSESSMENT AND PLAN:  Mr. Don is a very pleasant 89-year-old gentleman with 
a history of atrial fibrillation, on Xarelto, aortic stenosis, status post 
bioprosthetic valve replacement, osteoarthritis on chronic prednisone and 
hypertension, who presented to [facility] with complaints of melena 
and fatigue.  He was seen in his primary care doctor's office yesterday and was 
noted to have a positive guaiac, thus he was referred to the emergency room for 
further evaluation and admission.  The patient is currently hemodynamically 
stable.  His hemoglobin is 9.9 today.  He has not had any rectal bleeding 
today.  He is on a Protonix drip for possible peptic ulcer.  Given his history 
of long-term Xarelto and prednisone therapy, he could very well have a peptic 
ulcer.  We will hold the Xarelto for now and plan on an emergent upper 
endoscopy today.  He will maintain n.p.o. status.  We will continue to monitor 
the patient's hemoglobin.  The patient is in agreement with an upper endoscopy.
  Risks and benefits were discussed.  Further recommendations will be provided 
as the patient's clinical course progresses.  Case was discussed with Dr. Porter last night on admission.

 

 [id number]/[facility] #: [id number]

ZELDA

## 2018-07-21 NOTE — PRO
CC:  Dr. Contreras; Dr. Eula Sherman

 

GASTROENTEROLOGY OPERATIVE REPORT:

 

DATE OF PROCEDURE:  07/21/18

 

OPERATIVE PROCEDURE:  Esophagogastroduodenoscopy to third portion of duodenum.

 

GASTROENTEROLOGIST:  Eula Sherman DO

 

ANESTHESIA:

1.  Midazolam 6 mg IV.

2.  Fentanyl 50 mcg IV.

 

HISTORY OF PRESENT ILLNESS:  Carlos is a very pleasant 89-year-old gentleman with 
a significant medical history of aortic valve stenosis, status post 
bioprosthetic valve replacement, atrial fibrillation on Xarelto and prednisone 
therapy for arthritis who presents with complaints of melena and acute blood 
loss anemia.

 

PREOPERATIVE DIAGNOSES:

1.  Melena.

2.  Anemia.

3.  In the setting of Xarelto and prednisone therapy.

 

POSTOPERATIVE DIAGNOSES:

1.  3-mm cratered healing nonbleeding duodenal ulcer in the first portion of 
the duodenum.

2.  Mild antral gastritis with CLOtest to rule out Helicobacter pylori.

3.  Tortuous distal esophagus.

4.  No active bleeding seen.

 

RECOMMENDATIONS:

1.  We will follow up with CLOtest to rule out Helicobacter pylori.

2.  Findings discussed with Dr. Contreras as well as the patient's son, Jimbo, over 
the phone.

3.  We will discharge the patient home on PPI therapy twice daily for 6 weeks, 
then decrease to once daily.

4.  Recommend Xarelto to be restarted in 3 to 5 days if possible.

5.  Follow-up in our office in 2 weeks. 

 

DESCRIPTION OF PROCEDURE:  Esophagogastroduodenoscopy was explained in detail 
to the patient.  The risks, benefits, complications, alternatives, 
possibilities of missed lesions were explained and understood.  Complications 
included, but were not limited to, reaction to anesthesia, aspiration, 
increased risk of bleeding, infection and perforation.  All questions were 
answered.  The patient demonstrated understanding of the conversation.  
Informed consent was obtained.  



Next, the patient was brought to the endoscopy suite, placed in the left 
lateral recumbent position, where blood pressure, cardiac, and oxygen monitors 
were applied.  The patient was found to be a fit candidate for moderate 
anesthesia.  After adequate IV sedation was achieved, a bite-block was placed.  
Next, a standard adult Olympus endoscope was inserted per os under direct 
visualization to the first, second, and third portion of the duodenum.  Careful 
examination of the duodenum revealed a 3-mm cratered duodenal ulcer in the 
duodenal sweep.  This was nonbleeding and appears to be healing.  No previous 
stigmata of bleeding.  Further withdrawal of the endoscope into the gastric 
lumen revealed mild erythema in the antrum consistent with gastritis.  A 
CLOtest was performed to rule out H. pylori.  On retroflexion, the patient had 
a normal gastric cardia sling.  The rest of the gastric lumen was normal 
appearing.  Further withdrawal of the endoscope into the distal esophagus 
revealed a tortuous esophagus.  The Z-line was noted to be at 40 cm from the 
incisors and was regular appearing.  The rest of the tubular esophagus was 
normal appearing.  Air was then removed from the patient.  Endoscope was 
removed from the patient.  The patient tolerated the procedure well.  There 
were no immediate complications.  After a period of observation, the patient 
was transferred back to the medical floor for further care.

 

Thank you, Dr. Contreras, for allowing us to participate in the care of your 
patient. If you should have any further questions or concerns, please do not 
hesitate to contact us.

 

 [id number]/[facility] #: [id number]

ZELDA

## 2018-07-22 NOTE — DS
DISCHARGE SUMMARY:

 

DATE OF ADMISSION:  07/20/18

 

DATE OF DISCHARGE:  07/21/18

 

ADMITTING PROVIDER:  Maria T Porter DO

 

ATTENDING PHYSICIAN:  Len Contreras MD

 

PRIMARY CARE PHYSICIAN:  Renea King MD

 

CHIEF COMPLAINT:  Peanut butter consistency darker stools and fatigue.

 

PRINCIPAL DIAGNOSIS:  Upper gastrointestinal bleed secondary to small duodenal 
ulcer in the setting of chronic Xarelto and prednisone use.

 

HISTORY OF PRESENT ILLNESS:  Alberto Don is an 89-year-old male with a past 
medical history of atrial fibrillation, on Xarelto; aortic stenosis, status 
post bioprosthetic aortic valve repair; osteoarthritis; hypertension; PMR, on 
Plaquenil and prednisone for the last year, who for approximately 1 week 
noticed peanut butter consistency dark brownish stools with occasional alaina 
stool.  He had a positive guaiac at primary care physician, Dr. King's office
, and was referred for further evaluation.  Please see H&P of Dr. Maria T Porter for full details. His hemoglobin was noted to be 9.6, macrocytic (
), hematocrit 28, down from hemoglobin 15.6, hematocrit 44 in May 2018.  GI 
physician, Dr. Eula Sherman, was consulted who performed an EGD on hospital 
day #2, which demonstrated a small duodenal ulcer with no evidence of current 
bleeding.  He had been started on a Protonix drip after IV Protonix load and 
Dr. Sherman recommended b.i.d. PPI for the next 6 weeks, then daily, and then 
she was okay with him being discharged after the procedure given his 
hemodynamic stability.  He had iron studies done with a iron level of 47, 
ferritin of 52, and iron sat of 18.  He is being discharged with recommendation 
to follow up with his primary care provider to get the results of his CLOtest 
given some also areas of gastritis.  He was also told to hold his Xarelto for 2 
days.  He should eventually follow up with Dr. Mejias, his rheumatologist, for 
PMR to evaluate if he need to continue his prednisone dosing given the 
increased risk for GI bleeds and given his high CHADS2-VASc score, the 
consideration for continued chronic anticoagulation to prevent strokes.

 

DISCHARGE MEDICATIONS:  Include,

1.  Diltiazem 240 mg p.o. daily.

2.  Doxycycline 100 mg p.o. daily p.r.n. for dental procedures.

3.  Lasix 20 mg once daily.

4.  Plaquenil 400 mg p.o. daily.

5.  Xalatan 1 drop both eyes q.p.m.

6.  Pantoprazole 40 mg p.o. b.i.d. for 6 weeks, followed by 40 mg p.o. daily.

7.  Potassium chloride 10 mEq p.o. daily.

8.  Prednisone 5 mg p.o. daily.

9.  Xarelto 15 mg p.o. daily (though held for the next 2 days).

10.  Sotalol 40 mg p.o. b.i.d.

11.  PreserVision AREDS 2 Softgel 1 capsule p.o. b.i.d.

 

DISCHARGE DIET:  Heart healthy, unchanged.

 

ACTIVITY LEVEL:  No restrictions.

 

FOLLOWUP:  Please follow up with Dr. Renea King within 7 days of discharge to 
get CLOtest results.  He should eventually follow up with Dr. Mejias to discuss 
his chronic prednisone use.  He has been asked to hold his Xarelto for 2 days.  
Given his macrocytosis, the level of , it would be reasonable to check 
folate and B12 levels as an outpatient as well.  Also of note, he is on 
Plaquenil and the patient himself was not that clear on the indication, though 
possibly for PMR per his son. This, of course, also has potential side effects 
of agranulocytosis, thrombocytopenia, aplastic anemia, and the risks and 
benefits of this medication may need to be discussed as an outpatient with Dr. Mejias and primary care physician as well given his long-term need for 
anticoagulation.

 

TIME SPENT ON DISCHARGE:  35 minutes.

 

 [id number]/CPS #: [id number]

ZELDA